# Patient Record
Sex: MALE | Race: WHITE | Employment: OTHER | URBAN - METROPOLITAN AREA
[De-identification: names, ages, dates, MRNs, and addresses within clinical notes are randomized per-mention and may not be internally consistent; named-entity substitution may affect disease eponyms.]

---

## 2022-05-29 ENCOUNTER — APPOINTMENT (OUTPATIENT)
Dept: GENERAL RADIOLOGY | Age: 72
DRG: 189 | End: 2022-05-29
Payer: MEDICARE

## 2022-05-29 ENCOUNTER — APPOINTMENT (OUTPATIENT)
Dept: CT IMAGING | Age: 72
DRG: 189 | End: 2022-05-29
Payer: MEDICARE

## 2022-05-29 ENCOUNTER — HOSPITAL ENCOUNTER (INPATIENT)
Age: 72
LOS: 2 days | Discharge: NURSING, REHAB OR LONG-TERM CARE | DRG: 189 | End: 2022-05-31
Attending: EMERGENCY MEDICINE | Admitting: INTERNAL MEDICINE
Payer: MEDICARE

## 2022-05-29 DIAGNOSIS — J18.9 PNEUMONIA OF LEFT LOWER LOBE DUE TO INFECTIOUS ORGANISM: ICD-10-CM

## 2022-05-29 DIAGNOSIS — R09.02 HYPOXIA: Primary | ICD-10-CM

## 2022-05-29 PROBLEM — J96.01 ACUTE HYPOXEMIC RESPIRATORY FAILURE (HCC): Status: ACTIVE | Noted: 2022-05-29

## 2022-05-29 PROBLEM — J96.91 RESPIRATORY FAILURE WITH HYPOXIA (HCC): Status: ACTIVE | Noted: 2022-05-29

## 2022-05-29 LAB
A/G RATIO: 0.9 (ref 1.1–2.2)
ALBUMIN SERPL-MCNC: 3.6 G/DL (ref 3.4–5)
ALP BLD-CCNC: 87 U/L (ref 40–129)
ALT SERPL-CCNC: 20 U/L (ref 10–40)
ANION GAP SERPL CALCULATED.3IONS-SCNC: 9 MMOL/L (ref 3–16)
ANISOCYTOSIS: ABNORMAL
AST SERPL-CCNC: 21 U/L (ref 15–37)
BANDED NEUTROPHILS RELATIVE PERCENT: 7 % (ref 0–7)
BASE EXCESS ARTERIAL: 10.7 MMOL/L (ref -3–3)
BASOPHILS ABSOLUTE: 0 K/UL (ref 0–0.2)
BASOPHILS RELATIVE PERCENT: 0 %
BILIRUB SERPL-MCNC: <0.2 MG/DL (ref 0–1)
BILIRUBIN URINE: NEGATIVE
BLOOD, URINE: NEGATIVE
BUN BLDV-MCNC: 21 MG/DL (ref 7–20)
CALCIUM SERPL-MCNC: 9.7 MG/DL (ref 8.3–10.6)
CARBOXYHEMOGLOBIN ARTERIAL: 1.2 % (ref 0–1.5)
CHLORIDE BLD-SCNC: 97 MMOL/L (ref 99–110)
CLARITY: CLEAR
CO2: 33 MMOL/L (ref 21–32)
COLOR: YELLOW
CREAT SERPL-MCNC: 0.8 MG/DL (ref 0.8–1.3)
EKG ATRIAL RATE: 97 BPM
EKG DIAGNOSIS: NORMAL
EKG P AXIS: -1 DEGREES
EKG P-R INTERVAL: 168 MS
EKG Q-T INTERVAL: 360 MS
EKG QRS DURATION: 80 MS
EKG QTC CALCULATION (BAZETT): 457 MS
EKG R AXIS: -41 DEGREES
EKG T AXIS: 48 DEGREES
EKG VENTRICULAR RATE: 97 BPM
EOSINOPHILS ABSOLUTE: 0.7 K/UL (ref 0–0.6)
EOSINOPHILS RELATIVE PERCENT: 7 %
GFR AFRICAN AMERICAN: >60
GFR NON-AFRICAN AMERICAN: >60
GLUCOSE BLD-MCNC: 110 MG/DL (ref 70–99)
GLUCOSE BLD-MCNC: 135 MG/DL (ref 70–99)
GLUCOSE BLD-MCNC: 146 MG/DL (ref 70–99)
GLUCOSE BLD-MCNC: 156 MG/DL (ref 70–99)
GLUCOSE BLD-MCNC: 89 MG/DL (ref 70–99)
GLUCOSE URINE: NEGATIVE MG/DL
HCO3 ARTERIAL: 37.1 MMOL/L (ref 21–29)
HCT VFR BLD CALC: 28.9 % (ref 40.5–52.5)
HEMOGLOBIN, ART, EXTENDED: 10.1 G/DL (ref 13.5–17.5)
HEMOGLOBIN: 9.6 G/DL (ref 13.5–17.5)
KETONES, URINE: NEGATIVE MG/DL
LACTIC ACID, SEPSIS: 0.9 MMOL/L (ref 0.4–1.9)
LACTIC ACID, SEPSIS: 1.2 MMOL/L (ref 0.4–1.9)
LEUKOCYTE ESTERASE, URINE: NEGATIVE
LYMPHOCYTES ABSOLUTE: 1.8 K/UL (ref 1–5.1)
LYMPHOCYTES RELATIVE PERCENT: 18 %
MCH RBC QN AUTO: 30.5 PG (ref 26–34)
MCHC RBC AUTO-ENTMCNC: 33.1 G/DL (ref 31–36)
MCV RBC AUTO: 92.1 FL (ref 80–100)
METHEMOGLOBIN ARTERIAL: 0 %
MICROSCOPIC EXAMINATION: NORMAL
MONOCYTES ABSOLUTE: 0.8 K/UL (ref 0–1.3)
MONOCYTES RELATIVE PERCENT: 8 %
NEUTROPHILS ABSOLUTE: 6.8 K/UL (ref 1.7–7.7)
NEUTROPHILS RELATIVE PERCENT: 60 %
NITRITE, URINE: NEGATIVE
O2 SAT, ARTERIAL: 100 %
O2 THERAPY: ABNORMAL
PCO2 ARTERIAL: 59.1 MMHG (ref 35–45)
PDW BLD-RTO: 15.8 % (ref 12.4–15.4)
PERFORMED ON: ABNORMAL
PERFORMED ON: NORMAL
PH ARTERIAL: 7.41 (ref 7.35–7.45)
PH UA: 7 (ref 5–8)
PLATELET # BLD: 301 K/UL (ref 135–450)
PLATELET SLIDE REVIEW: ADEQUATE
PMV BLD AUTO: 7.2 FL (ref 5–10.5)
PO2 ARTERIAL: 206 MMHG (ref 75–108)
POLYCHROMASIA: ABNORMAL
POTASSIUM REFLEX MAGNESIUM: 4.8 MMOL/L (ref 3.5–5.1)
PRO-BNP: 107 PG/ML (ref 0–124)
PROCALCITONIN: 0.1 NG/ML (ref 0–0.15)
PROTEIN UA: NEGATIVE MG/DL
RBC # BLD: 3.14 M/UL (ref 4.2–5.9)
SLIDE REVIEW: ABNORMAL
SODIUM BLD-SCNC: 139 MMOL/L (ref 136–145)
SPECIFIC GRAVITY UA: 1.02 (ref 1–1.03)
T4 FREE: 1.3 NG/DL (ref 0.9–1.8)
TCO2 ARTERIAL: 87.3 MMOL/L
TOTAL PROTEIN: 7.5 G/DL (ref 6.4–8.2)
TROPONIN: <0.01 NG/ML
TSH REFLEX: 14.41 UIU/ML (ref 0.27–4.2)
URINE REFLEX TO CULTURE: NORMAL
URINE TYPE: NORMAL
UROBILINOGEN, URINE: 1 E.U./DL
WBC # BLD: 10.1 K/UL (ref 4–11)

## 2022-05-29 PROCEDURE — 93005 ELECTROCARDIOGRAM TRACING: CPT | Performed by: EMERGENCY MEDICINE

## 2022-05-29 PROCEDURE — 87040 BLOOD CULTURE FOR BACTERIA: CPT

## 2022-05-29 PROCEDURE — 94761 N-INVAS EAR/PLS OXIMETRY MLT: CPT

## 2022-05-29 PROCEDURE — 2000000000 HC ICU R&B

## 2022-05-29 PROCEDURE — 87449 NOS EACH ORGANISM AG IA: CPT

## 2022-05-29 PROCEDURE — 85025 COMPLETE CBC W/AUTO DIFF WBC: CPT

## 2022-05-29 PROCEDURE — 99285 EMERGENCY DEPT VISIT HI MDM: CPT

## 2022-05-29 PROCEDURE — 6360000002 HC RX W HCPCS: Performed by: INTERNAL MEDICINE

## 2022-05-29 PROCEDURE — 2700000000 HC OXYGEN THERAPY PER DAY

## 2022-05-29 PROCEDURE — 84145 PROCALCITONIN (PCT): CPT

## 2022-05-29 PROCEDURE — 84439 ASSAY OF FREE THYROXINE: CPT

## 2022-05-29 PROCEDURE — 93010 ELECTROCARDIOGRAM REPORT: CPT | Performed by: INTERNAL MEDICINE

## 2022-05-29 PROCEDURE — 94640 AIRWAY INHALATION TREATMENT: CPT

## 2022-05-29 PROCEDURE — 83605 ASSAY OF LACTIC ACID: CPT

## 2022-05-29 PROCEDURE — 80053 COMPREHEN METABOLIC PANEL: CPT

## 2022-05-29 PROCEDURE — 2580000003 HC RX 258: Performed by: INTERNAL MEDICINE

## 2022-05-29 PROCEDURE — 83036 HEMOGLOBIN GLYCOSYLATED A1C: CPT

## 2022-05-29 PROCEDURE — 6370000000 HC RX 637 (ALT 250 FOR IP): Performed by: INTERNAL MEDICINE

## 2022-05-29 PROCEDURE — 99223 1ST HOSP IP/OBS HIGH 75: CPT | Performed by: INTERNAL MEDICINE

## 2022-05-29 PROCEDURE — 0BJ08ZZ INSPECTION OF TRACHEOBRONCHIAL TREE, VIA NATURAL OR ARTIFICIAL OPENING ENDOSCOPIC: ICD-10-PCS | Performed by: INTERNAL MEDICINE

## 2022-05-29 PROCEDURE — 6360000004 HC RX CONTRAST MEDICATION: Performed by: INTERNAL MEDICINE

## 2022-05-29 PROCEDURE — 71260 CT THORAX DX C+: CPT

## 2022-05-29 PROCEDURE — 84443 ASSAY THYROID STIM HORMONE: CPT

## 2022-05-29 PROCEDURE — 81003 URINALYSIS AUTO W/O SCOPE: CPT

## 2022-05-29 PROCEDURE — 31622 DX BRONCHOSCOPE/WASH: CPT | Performed by: INTERNAL MEDICINE

## 2022-05-29 PROCEDURE — 36415 COLL VENOUS BLD VENIPUNCTURE: CPT

## 2022-05-29 PROCEDURE — 71045 X-RAY EXAM CHEST 1 VIEW: CPT

## 2022-05-29 PROCEDURE — 82803 BLOOD GASES ANY COMBINATION: CPT

## 2022-05-29 PROCEDURE — 84484 ASSAY OF TROPONIN QUANT: CPT

## 2022-05-29 PROCEDURE — 83880 ASSAY OF NATRIURETIC PEPTIDE: CPT

## 2022-05-29 RX ORDER — SODIUM CHLORIDE 9 MG/ML
INJECTION, SOLUTION INTRAVENOUS PRN
Status: DISCONTINUED | OUTPATIENT
Start: 2022-05-29 | End: 2022-05-31 | Stop reason: HOSPADM

## 2022-05-29 RX ORDER — SODIUM CHLORIDE 0.9 % (FLUSH) 0.9 %
5-40 SYRINGE (ML) INJECTION PRN
Status: DISCONTINUED | OUTPATIENT
Start: 2022-05-29 | End: 2022-05-31 | Stop reason: HOSPADM

## 2022-05-29 RX ORDER — TRAZODONE HYDROCHLORIDE 50 MG/1
50 TABLET ORAL NIGHTLY
Status: DISCONTINUED | OUTPATIENT
Start: 2022-05-29 | End: 2022-05-31 | Stop reason: HOSPADM

## 2022-05-29 RX ORDER — ACETAMINOPHEN 160 MG/5ML
650 SUSPENSION ORAL EVERY 6 HOURS PRN
COMMUNITY

## 2022-05-29 RX ORDER — INSULIN LISPRO 100 [IU]/ML
0-3 INJECTION, SOLUTION INTRAVENOUS; SUBCUTANEOUS NIGHTLY
Status: DISCONTINUED | OUTPATIENT
Start: 2022-05-29 | End: 2022-05-31 | Stop reason: HOSPADM

## 2022-05-29 RX ORDER — GLYCOPYRROLATE 0.2 MG/ML
0.2 INJECTION INTRAMUSCULAR; INTRAVENOUS EVERY 8 HOURS PRN
COMMUNITY

## 2022-05-29 RX ORDER — BUPROPION HYDROCHLORIDE 75 MG/1
75 TABLET ORAL 2 TIMES DAILY
COMMUNITY

## 2022-05-29 RX ORDER — LACOSAMIDE 100 MG/1
100 TABLET ORAL 2 TIMES DAILY
Status: DISCONTINUED | OUTPATIENT
Start: 2022-05-29 | End: 2022-05-31 | Stop reason: HOSPADM

## 2022-05-29 RX ORDER — HYDROXYZINE 50 MG/1
50 TABLET, FILM COATED ORAL EVERY 8 HOURS PRN
COMMUNITY

## 2022-05-29 RX ORDER — LACOSAMIDE 100 MG/1
100 TABLET ORAL 2 TIMES DAILY
COMMUNITY

## 2022-05-29 RX ORDER — ALBUTEROL SULFATE 2.5 MG/3ML
2.5 SOLUTION RESPIRATORY (INHALATION) 2 TIMES DAILY
Status: ON HOLD | COMMUNITY
End: 2022-05-29

## 2022-05-29 RX ORDER — LIDOCAINE 4 G/G
1 PATCH TOPICAL DAILY
COMMUNITY

## 2022-05-29 RX ORDER — BUPROPION HYDROCHLORIDE 75 MG/1
75 TABLET ORAL 2 TIMES DAILY
Status: DISCONTINUED | OUTPATIENT
Start: 2022-05-30 | End: 2022-05-31 | Stop reason: HOSPADM

## 2022-05-29 RX ORDER — QUETIAPINE FUMARATE 25 MG/1
25 TABLET, FILM COATED ORAL 2 TIMES DAILY
COMMUNITY

## 2022-05-29 RX ORDER — IBUPROFEN 400 MG/1
400 TABLET ORAL EVERY 6 HOURS PRN
COMMUNITY

## 2022-05-29 RX ORDER — ALBUTEROL SULFATE 90 UG/1
2 AEROSOL, METERED RESPIRATORY (INHALATION) EVERY 6 HOURS PRN
COMMUNITY

## 2022-05-29 RX ORDER — ALBUTEROL SULFATE 2.5 MG/3ML
2.5 SOLUTION RESPIRATORY (INHALATION) EVERY 6 HOURS PRN
Status: DISCONTINUED | OUTPATIENT
Start: 2022-05-29 | End: 2022-05-31 | Stop reason: HOSPADM

## 2022-05-29 RX ORDER — ONDANSETRON 2 MG/ML
4 INJECTION INTRAMUSCULAR; INTRAVENOUS EVERY 6 HOURS PRN
Status: DISCONTINUED | OUTPATIENT
Start: 2022-05-29 | End: 2022-05-31 | Stop reason: HOSPADM

## 2022-05-29 RX ORDER — ONDANSETRON HYDROCHLORIDE 8 MG/1
8 TABLET, FILM COATED ORAL EVERY 6 HOURS PRN
COMMUNITY

## 2022-05-29 RX ORDER — IPRATROPIUM BROMIDE AND ALBUTEROL SULFATE 2.5; .5 MG/3ML; MG/3ML
1 SOLUTION RESPIRATORY (INHALATION) EVERY 4 HOURS PRN
Status: DISCONTINUED | OUTPATIENT
Start: 2022-05-29 | End: 2022-05-31 | Stop reason: HOSPADM

## 2022-05-29 RX ORDER — FAMOTIDINE 20 MG/1
20 TABLET, FILM COATED ORAL 2 TIMES DAILY
Status: DISCONTINUED | OUTPATIENT
Start: 2022-05-29 | End: 2022-05-31 | Stop reason: HOSPADM

## 2022-05-29 RX ORDER — VALPROIC ACID 250 MG/5ML
400 SOLUTION ORAL 2 TIMES DAILY
COMMUNITY

## 2022-05-29 RX ORDER — ONDANSETRON 4 MG/1
4 TABLET, ORALLY DISINTEGRATING ORAL EVERY 8 HOURS PRN
Status: DISCONTINUED | OUTPATIENT
Start: 2022-05-29 | End: 2022-05-31 | Stop reason: HOSPADM

## 2022-05-29 RX ORDER — CHLORHEXIDINE GLUCONATE 0.12 MG/ML
15 RINSE ORAL 3 TIMES DAILY
COMMUNITY

## 2022-05-29 RX ORDER — VANCOMYCIN HYDROCHLORIDE 1 G/200ML
1000 INJECTION, SOLUTION INTRAVENOUS ONCE
Status: DISCONTINUED | OUTPATIENT
Start: 2022-05-29 | End: 2022-05-29

## 2022-05-29 RX ORDER — TRAMADOL HYDROCHLORIDE 50 MG/1
50 TABLET ORAL EVERY 8 HOURS PRN
COMMUNITY

## 2022-05-29 RX ORDER — LISINOPRIL 5 MG/1
5 TABLET ORAL DAILY
COMMUNITY

## 2022-05-29 RX ORDER — SENNA PLUS 8.6 MG/1
1 TABLET ORAL 2 TIMES DAILY PRN
COMMUNITY

## 2022-05-29 RX ORDER — INSULIN LISPRO 100 [IU]/ML
0-6 INJECTION, SOLUTION INTRAVENOUS; SUBCUTANEOUS
Status: DISCONTINUED | OUTPATIENT
Start: 2022-05-29 | End: 2022-05-31 | Stop reason: HOSPADM

## 2022-05-29 RX ORDER — FLUTICASONE PROPIONATE 50 MCG
1 SPRAY, SUSPENSION (ML) NASAL DAILY PRN
COMMUNITY

## 2022-05-29 RX ORDER — PHENOL 1.4 %
10 AEROSOL, SPRAY (ML) MUCOUS MEMBRANE NIGHTLY
COMMUNITY

## 2022-05-29 RX ORDER — IPRATROPIUM BROMIDE AND ALBUTEROL SULFATE 2.5; .5 MG/3ML; MG/3ML
1 SOLUTION RESPIRATORY (INHALATION) 2 TIMES DAILY
Status: DISCONTINUED | OUTPATIENT
Start: 2022-05-29 | End: 2022-05-31 | Stop reason: HOSPADM

## 2022-05-29 RX ORDER — LEVOFLOXACIN 750 MG/1
750 TABLET ORAL DAILY
COMMUNITY

## 2022-05-29 RX ORDER — TERAZOSIN 2 MG/1
2 CAPSULE ORAL NIGHTLY
COMMUNITY

## 2022-05-29 RX ORDER — SODIUM CHLORIDE FOR INHALATION 3 %
4 VIAL, NEBULIZER (ML) INHALATION EVERY 8 HOURS
COMMUNITY

## 2022-05-29 RX ORDER — FOLIC ACID 1 MG/1
1 TABLET ORAL DAILY
COMMUNITY

## 2022-05-29 RX ORDER — LEVOTHYROXINE SODIUM 0.15 MG/1
150 TABLET ORAL DAILY
COMMUNITY

## 2022-05-29 RX ORDER — IPRATROPIUM BROMIDE AND ALBUTEROL SULFATE 2.5; .5 MG/3ML; MG/3ML
1 SOLUTION RESPIRATORY (INHALATION) EVERY 8 HOURS
COMMUNITY

## 2022-05-29 RX ORDER — ECHINACEA PURPUREA EXTRACT 125 MG
1 TABLET ORAL 2 TIMES DAILY
COMMUNITY

## 2022-05-29 RX ORDER — FAMOTIDINE 20 MG/1
20 TABLET, FILM COATED ORAL 2 TIMES DAILY
COMMUNITY

## 2022-05-29 RX ORDER — ACETYLCYSTEINE 200 MG/ML
4 SOLUTION ORAL; RESPIRATORY (INHALATION) 2 TIMES DAILY
COMMUNITY

## 2022-05-29 RX ORDER — ESCITALOPRAM OXALATE 20 MG/1
20 TABLET ORAL DAILY
COMMUNITY

## 2022-05-29 RX ORDER — DOXYCYCLINE 100 MG/1
100 CAPSULE ORAL DAILY
COMMUNITY

## 2022-05-29 RX ORDER — ESCITALOPRAM OXALATE 10 MG/1
20 TABLET ORAL DAILY
Status: DISCONTINUED | OUTPATIENT
Start: 2022-05-30 | End: 2022-05-31 | Stop reason: HOSPADM

## 2022-05-29 RX ORDER — POLYETHYLENE GLYCOL 3350 17 G/17G
17 POWDER, FOR SOLUTION ORAL DAILY PRN
Status: DISCONTINUED | OUTPATIENT
Start: 2022-05-29 | End: 2022-05-31 | Stop reason: HOSPADM

## 2022-05-29 RX ORDER — ACETAMINOPHEN 650 MG/1
650 SUPPOSITORY RECTAL EVERY 6 HOURS PRN
Status: DISCONTINUED | OUTPATIENT
Start: 2022-05-29 | End: 2022-05-31 | Stop reason: HOSPADM

## 2022-05-29 RX ORDER — LEVOTHYROXINE SODIUM 0.1 MG/1
150 TABLET ORAL DAILY
Status: DISCONTINUED | OUTPATIENT
Start: 2022-05-30 | End: 2022-05-31 | Stop reason: HOSPADM

## 2022-05-29 RX ORDER — DICLOXACILLIN SODIUM 250 MG/1
250 CAPSULE ORAL 4 TIMES DAILY
Status: ON HOLD | COMMUNITY
End: 2022-05-29

## 2022-05-29 RX ORDER — TRAZODONE HYDROCHLORIDE 50 MG/1
50 TABLET ORAL NIGHTLY
COMMUNITY

## 2022-05-29 RX ORDER — QUETIAPINE FUMARATE 25 MG/1
25 TABLET, FILM COATED ORAL 2 TIMES DAILY
Status: DISCONTINUED | OUTPATIENT
Start: 2022-05-29 | End: 2022-05-31 | Stop reason: HOSPADM

## 2022-05-29 RX ORDER — GUAIFENESIN AND CODEINE PHOSPHATE 100; 10 MG/5ML; MG/5ML
10 SOLUTION ORAL 4 TIMES DAILY PRN
Status: ON HOLD | COMMUNITY
End: 2022-05-29

## 2022-05-29 RX ORDER — VALPROIC ACID 250 MG/5ML
400 SOLUTION ORAL 2 TIMES DAILY
Status: DISCONTINUED | OUTPATIENT
Start: 2022-05-29 | End: 2022-05-31 | Stop reason: HOSPADM

## 2022-05-29 RX ORDER — DEXTROSE MONOHYDRATE 50 MG/ML
100 INJECTION, SOLUTION INTRAVENOUS PRN
Status: DISCONTINUED | OUTPATIENT
Start: 2022-05-29 | End: 2022-05-31 | Stop reason: HOSPADM

## 2022-05-29 RX ORDER — DEXTROMETHORPHAN HBR, GUAIFENESIN, PHENYLEPHRINE 20; 200; 10 MG/10ML; MG/10ML; MG/10ML
10 LIQUID ORAL EVERY 4 HOURS PRN
COMMUNITY

## 2022-05-29 RX ORDER — TRAMADOL HYDROCHLORIDE 50 MG/1
50 TABLET ORAL EVERY 8 HOURS PRN
Status: DISCONTINUED | OUTPATIENT
Start: 2022-05-29 | End: 2022-05-31 | Stop reason: HOSPADM

## 2022-05-29 RX ORDER — ALBUTEROL SULFATE 2.5 MG/3ML
2.5 SOLUTION RESPIRATORY (INHALATION)
COMMUNITY

## 2022-05-29 RX ORDER — LISINOPRIL 10 MG/1
5 TABLET ORAL DAILY
Status: DISCONTINUED | OUTPATIENT
Start: 2022-05-30 | End: 2022-05-31 | Stop reason: HOSPADM

## 2022-05-29 RX ORDER — SODIUM CHLORIDE 0.9 % (FLUSH) 0.9 %
5-40 SYRINGE (ML) INJECTION EVERY 12 HOURS SCHEDULED
Status: DISCONTINUED | OUTPATIENT
Start: 2022-05-29 | End: 2022-05-31 | Stop reason: HOSPADM

## 2022-05-29 RX ORDER — ACETAMINOPHEN 325 MG/1
650 TABLET ORAL EVERY 6 HOURS PRN
Status: DISCONTINUED | OUTPATIENT
Start: 2022-05-29 | End: 2022-05-31 | Stop reason: HOSPADM

## 2022-05-29 RX ADMIN — LACOSAMIDE 100 MG: 100 TABLET, FILM COATED ORAL at 19:37

## 2022-05-29 RX ADMIN — IOPAMIDOL 75 ML: 755 INJECTION, SOLUTION INTRAVENOUS at 09:03

## 2022-05-29 RX ADMIN — IPRATROPIUM BROMIDE AND ALBUTEROL SULFATE 1 AMPULE: .5; 3 SOLUTION RESPIRATORY (INHALATION) at 20:00

## 2022-05-29 RX ADMIN — ACETAMINOPHEN 650 MG: 325 TABLET ORAL at 19:37

## 2022-05-29 RX ADMIN — FAMOTIDINE 20 MG: 20 TABLET, FILM COATED ORAL at 19:37

## 2022-05-29 RX ADMIN — SODIUM CHLORIDE, PRESERVATIVE FREE 10 ML: 5 INJECTION INTRAVENOUS at 19:38

## 2022-05-29 RX ADMIN — VALPROIC ACID 400 MG: 250 SOLUTION ORAL at 19:36

## 2022-05-29 RX ADMIN — QUETIAPINE FUMARATE 25 MG: 25 TABLET ORAL at 18:25

## 2022-05-29 RX ADMIN — VANCOMYCIN HYDROCHLORIDE 1000 MG: 1 INJECTION, POWDER, LYOPHILIZED, FOR SOLUTION INTRAVENOUS at 10:52

## 2022-05-29 RX ADMIN — TRAZODONE HYDROCHLORIDE 50 MG: 50 TABLET ORAL at 19:37

## 2022-05-29 RX ADMIN — APIXABAN 5 MG: 5 TABLET, FILM COATED ORAL at 19:37

## 2022-05-29 RX ADMIN — CEFEPIME HYDROCHLORIDE 2000 MG: 2 INJECTION, POWDER, FOR SOLUTION INTRAVENOUS at 09:16

## 2022-05-29 ASSESSMENT — PAIN SCALES - GENERAL
PAINLEVEL_OUTOF10: 6
PAINLEVEL_OUTOF10: 0
PAINLEVEL_OUTOF10: 6
PAINLEVEL_OUTOF10: 0

## 2022-05-29 ASSESSMENT — PAIN DESCRIPTION - ONSET: ONSET: ON-GOING

## 2022-05-29 ASSESSMENT — PAIN DESCRIPTION - PAIN TYPE
TYPE: ACUTE PAIN
TYPE: ACUTE PAIN

## 2022-05-29 ASSESSMENT — PAIN - FUNCTIONAL ASSESSMENT
PAIN_FUNCTIONAL_ASSESSMENT: PREVENTS OR INTERFERES SOME ACTIVE ACTIVITIES AND ADLS
PAIN_FUNCTIONAL_ASSESSMENT: 0-10
PAIN_FUNCTIONAL_ASSESSMENT: ACTIVITIES ARE NOT PREVENTED

## 2022-05-29 ASSESSMENT — PAIN DESCRIPTION - ORIENTATION: ORIENTATION: RIGHT;LEFT

## 2022-05-29 ASSESSMENT — PAIN DESCRIPTION - FREQUENCY
FREQUENCY: CONTINUOUS
FREQUENCY: CONTINUOUS

## 2022-05-29 ASSESSMENT — PAIN DESCRIPTION - LOCATION
LOCATION: BACK
LOCATION: CHEST

## 2022-05-29 NOTE — PROGRESS NOTES
Pt admitted to room 5908 from ED, pt alert and oriented, non verbal, mouth words and write out need appropriately. VSS on T-Pice, 70%/10L. Pt oriented to room, POC reviewed with pt and wife, all needs with reach, will continue to monitor.

## 2022-05-29 NOTE — ED NOTES
Bedside handoff given to [facility]. This RN transported pt with monitor to room 5908. Wife at bedside. Suctioned thick secretions prior to transport. Wife stated pt has not been sitting up in a chair at ecf. Prior to recent ecf placement.   Pt had worked with pt and ot, able to walk some with walker and doing some adl's     Severiano Kerry, RN  05/29/22 [address], RN  05/29/22 [id number]

## 2022-05-29 NOTE — ACP (ADVANCE CARE PLANNING)
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 Advanced Care Planning Documents: Completed advances directives, advanced directives in chart.       Electronically signed by Radha Wallace MD on 5/29/2022 at 5:54 PM

## 2022-05-29 NOTE — PROGRESS NOTES
4 Eyes Skin Assessment     NAME:  Ivonne Tyson  YOB: 1950  MEDICAL RECORD NUMBER:  [id number]    The patient is being assess for  Admission    I agree that 2 RN's have performed a thorough Head to Toe Skin Assessment on the patient. ALL assessment sites listed below have been assessed. Areas assessed by both nurses:    Head, Face, Ears, Shoulders, Back, Chest, Arms, Elbows, Hands, Sacrum. Buttock, Coccyx, Ischium and Legs. Feet and Heels        Does the Patient have a Wound?  No noted wound(s)       Tony Prevention initiated:  Yes   Wound Care Orders initiated:  NA    Pressure Injury (Stage 3,4, Unstageable, DTI, NWPT, and Complex wounds) if present place consult order under [de-identified] NA    New and Established Ostomies if present place consult order under : No      Nurse 1 eSignature: Electronically signed by Sarina Treviño RN on 5/29/22 at 5:29 PM EDT    **SHARE this note so that the co-signing nurse is able to place an eSignature**    Nurse 2 eSignature: Electronically signed by Yossi Rush RN on 5/29/22 at [address] PM EDT

## 2022-05-29 NOTE — H&P
HOSPITALISTS HISTORY AND PHYSICAL    5/29/2022 8:55 AM    Patient Information:  Miguel Wong is a 71 y.o. male [id number]  PCP:  No primary care provider on file. (Tel: None )    Chief complaint:    Chief Complaint   Patient presents with    Shortness of Breath     Pt brought in by [facility] for CP, possible syncopal event, found unresponsive by staff at [id number] hrs, when EMS arrived Pt was sitting in a chair, pale/diaphoretic, nonverbal pointing at his chest that it hurts. 6/10 pain, hx of afib and DM. EKG showed ST per EMS.  Chest Pain        History of Present Illness:  Gerry Friare is a 71 y.o. 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  When EMS arrived, patient was pale, diaphoretic and reported that his chest was hurting. Stat EKG obtained showed sinus tachycardia per EMS report. On admission, patient was hypoxic and hypercarbic. CXR showed complete opacification of left hemithorax. Of note, patient was recently admitted at [facility] (4/18/22 -5/2/22) for DHIRAJ PNA; treated Vanco and cefepime for MRSA and Pseudomonas pneumonia. Underwent bronchoscopy on 4/29/22 which showed severe mucous plugging. BAL grew antonio which was thought to be contamination per infectious disease recs.        REVIEW OF SYSTEMS:   Detailed 12 point ROS obtained which were negative except what mentioned above in HPI    Past Medical History:   has a past medical history of Anemia, Anxiety, Aspiration pneumonia ([address] Atrial fibrillation ([address] CAD (coronary artery disease), Cancer ([address] Chronic kidney disease, Depression, Diabetes mellitus ([address] GERD (gastroesophageal reflux disease), History of malignant neoplasm of tongue, Hyperlipidemia, Hypertension, IBS (irritable bowel syndrome), Pneumonia, Schizoaffective disorder ([facility] [facility] Seizures ([facility] and Thyroid disease. Past Surgical History:   has a past surgical history that includes tracheostomy and Gastrostomy tube placement. Medications:  No current facility-administered medications on file prior to encounter. Current Outpatient Medications on File Prior to Encounter   Medication Sig Dispense Refill    acetaminophen (TYLENOL) 160 MG/5ML liquid Take 15 mg/kg by mouth every 6 hours as needed for Fever or Pain      acetylcysteine (MUCOMYST) 20 % nebulizer solution 4 mLs 2 times daily      albuterol sulfate HFA (PROVENTIL HFA) 108 (90 Base) MCG/ACT inhaler Inhale 2 puffs into the lungs every 6 hours as needed for Wheezing or Shortness of Breath      albuterol (PROVENTIL) (2.5 MG/3ML) 0.083% nebulizer solution Take 2.5 mg by nebulization every 2 hours as needed for Wheezing or Shortness of Breath      apixaban (ELIQUIS) 5 MG TABS tablet Take 5 mg by mouth 2 times daily      buPROPion (WELLBUTRIN) 75 MG tablet Take 75 mg by mouth 2 times daily      chlorhexidine (PERIDEX) 0.12 % solution Take 15 mLs by mouth in the morning, at noon, and at bedtime Take 15 ml by mouth TID for URI.       dicloxacillin (DYNAPEN) 250 MG capsule Take 250 mg by mouth 4 times daily      diclofenac sodium (VOLTAREN) 1 % GEL Apply 2 g topically 2 times daily      doxycycline Monohydrate (VIBRAMYCIN) 25 MG/5ML SUSR suspension 100 mg by Per G Tube route daily      escitalopram (LEXAPRO) 20 MG tablet Take 20 mg by mouth daily      famotidine (PEPCID) 20 MG tablet Take 20 mg by mouth 2 times daily      fluticasone (FLONASE) 50 MCG/ACT nasal spray 1 spray by Each Nostril route daily as needed for Rhinitis      folic acid (FOLVITE) 1 MG tablet Take 1 mg by mouth daily      glycopyrrolate (ROBINUL) 0.2 MG/ML injection Infuse 0.2 mg intravenously every 8 hours as needed (neurologic disorders)      insulin regular (HUMULIN R;NOVOLIN R) 100 UNIT/ML injection Inject into the skin 3 times daily (before meals) 0-150 = 0  151-200 = 2  201-250 = 4  251-300 = 6  301-350 = 8  351-600 = 12 recheck in 2 hours      hydrOXYzine (ATARAX) 50 MG tablet Take 50 mg by mouth every 8 hours as needed for Itching or Anxiety      ibuprofen (ADVIL;MOTRIN) 400 MG tablet Take 400 mg by mouth every 6 hours as needed for Pain      ipratropium-albuterol (DUONEB) 0.5-2.5 (3) MG/3ML SOLN nebulizer solution Inhale 1 vial into the lungs every 8 hours      levoFLOXacin (LEVAQUIN) 750 MG tablet Take 750 mg by mouth daily For 5 days, started on 5/12/21.  levothyroxine (SYNTHROID) 150 MCG tablet Take 150 mcg by mouth Daily      lidocaine 4 % external patch Place 1 patch onto the skin daily 12 hours on, 12 hours off.  lisinopril (PRINIVIL;ZESTRIL) 5 MG tablet Take 5 mg by mouth daily      melatonin 3 mg TABS tablet Take 10 mg by mouth daily      ondansetron (ZOFRAN) 8 MG tablet Take 8 mg by mouth every 6 hours as needed for Nausea or Vomiting      QUEtiapine (SEROQUEL) 25 MG tablet Take 25 mg by mouth 2 times daily At noon and 1700 hrs.  guaiFENesin-codeine (ROBAFEN AC) 100-10 MG/5ML syrup Take 10 mLs by mouth 4 times daily as needed for Cough.  sodium chloride (SALINE MIST) 0.65 % nasal spray 1 spray by Nasal route in the morning and at bedtime      senna (SENOKOT) 8.6 MG tablet Take 1 tablet by mouth 2 times daily as needed for Constipation      sodium chloride, Inhalant, 3 % nebulizer solution Take 4 mLs by nebulization every 8 hours      terazosin (HYTRIN) 2 MG capsule Take 2 mg by mouth nightly      traMADol (ULTRAM) 50 MG tablet Take 50 mg by mouth every 8 hours as needed for Pain.       traZODone (DESYREL) 50 MG tablet Take 50 mg by mouth nightly      valproic acid (DEPAKENE) 250 MG/5ML SOLN oral solution Take 400 mg by mouth 2 times daily      lacosamide (VIMPAT) 100 MG TABS tablet Take 100 mg by mouth 2 times daily. Allergies: Allergies   Allergen Reactions    Ciprofloxacin     Hydromorphone         Social History:        Family History:  family history is not on file. Physical Exam:  /78   Pulse 89   Temp 98.3 °F (36.8 °C) (Oral)   Resp 19   Ht 5' 6\" (1.676 m)   Wt 145 lb (65.8 kg)   SpO2 100%   BMI 23.40 kg/m²     General appearance: I awake and alert in no apparent distress  HEENT Normal cephalic, atraumatic without obvious deformity. Trach in place  Lungs: No increased work of breathing. Diminished breath sound on left hemithorax  Heart: Regular rate and rhythm with Normal S1/S2 without murmurs  Abdomen: Soft, non-tender, nondistended normal BS. PEG in place  Extremities: No clubbing, cyanosis, or edema bilaterally. Full range of motion without deformity and normal gait intact. Skin: Skin color, texture, turgor normal.  No rashes or lesions. Neurologic: Alert and oriented  Cranial nerves: II-XII intact, grossly non-focal.  Psychiatry: Calm, awake and alert  Skin: Warm, dry, normal turgor, no rash  Brisk capillary refill, peripheral pulses palpable     Labs:  CBC:   Lab Results   Component Value Date    WBC 10.1 05/29/2022    RBC 3.14 05/29/2022    HGB 9.6 05/29/2022    HCT 28.9 05/29/2022    MCV 92.1 05/29/2022    MCH 30.5 05/29/2022    MCHC 33.1 05/29/2022    RDW 15.8 05/29/2022     05/29/2022    MPV 7.2 05/29/2022     BMP:    Lab Results   Component Value Date     05/29/2022    K 4.8 05/29/2022    CL 97 05/29/2022    CO2 33 05/29/2022    BUN 21 05/29/2022    CREATININE 0.8 05/29/2022    CALCIUM 9.7 05/29/2022    GFRAA >60 05/29/2022    LABGLOM >60 05/29/2022    GLUCOSE 156 05/29/2022     XR CHEST PORTABLE   Final Result   Complete opacification of the left hemithorax. CT CHEST W CONTRAST    (Results Pending)       Discussed case  with ED physician    Problem List  Active Problems:    * No active hospital problems. *  Resolved Problems:    * No resolved hospital problems. *        Assessment/Plan:     Acute hypoxic and hypercapnic respiratory failure likely 2/2 mucous plugging; T-piece in place at FiO2 70%  CXR complete opacification of left hemithorax. CT chest showed near complete opacification left hemithorax without any obvious airway obstruction. Severe bronchiectasis and fibrosis noted  Pulmonology on board: s/p bronch on 5/ 29/22 which showed scant secretion with widely patent airways. BAL cx sent  Continue scheduled nebulized treatment, MetaNebs and vest therapy  Supplemental oxygen as needed to keep sats above 90%    Hx of A. fib: On Eliquis    Hypertension: Continue lisinopril. Monitor closely    Hx of diabetes mellitus: Hold oral antidiabetic medications  Hemoglobin A1c ordered. Start SSI. Monitor glucose closely    Hx of hypothyroidism: Continue Synthroid. 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 Please note that some part of this chart was generated using Dragon dictation software. Although every effort was made to ensure the accuracy of this automated transcription, some errors in transcription may have occurred inadvertently. If you may need any clarification, please do not hesitate to contact me through [facility].        Nati Vernon MD    5/29/2022 8:55 AM

## 2022-05-29 NOTE — RT PROTOCOL NOTE
RT Nebulizer Bronchodilator Protocol Note    There is a bronchodilator order in the chart from a provider indicating to follow the RT Bronchodilator Protocol and there is an Initiate RT Bronchodilator Protocol order as well (see protocol at bottom of note). CXR Findings:  XR CHEST PORTABLE    Result Date: 5/29/2022  Complete opacification of the left hemithorax. The findings from the last RT Protocol Assessment were as follows:  Smoking: Smoker 15 pack years or more  Respiratory Pattern: Regular pattern and RR 12-20 bpm  Breath Sounds: Slightly diminished and/or crackles  Cough: Strong, productive  Indication for Bronchodilator Therapy:    Bronchodilator Assessment Score: 4    Aerosolized bronchodilator medication orders have been revised according to the RT Nebulizer Bronchodilator Protocol below. Respiratory Therapist to perform RT Therapy Protocol Assessment initially then follow the protocol. Repeat RT Therapy Protocol Assessment PRN for score 0-3 or on second treatment, BID, and PRN for scores above 3. No Indications - adjust the frequency to every 6 hours PRN wheezing or bronchospasm, if no treatments needed after 48 hours then discontinue using Per Protocol order mode. If indication present, adjust the RT bronchodilator orders based on the Bronchodilator Assessment Score as indicated below. If a patient is on this medication at home then do not decrease Frequency below that used at home. 0-3 - enter or revise RT bronchodilator order(s) to equivalent RT Bronchodilator order with Frequency of every 4 hours PRN for wheezing or increased work of breathing using Per Protocol order mode. 4-6 - enter or revise RT Bronchodilator order(s) to two equivalent RT bronchodilator orders with one order with BID Frequency and one order with Frequency of every 4 hours PRN wheezing or increased work of breathing using Per Protocol order mode.          7-10 - enter or revise RT Bronchodilator order(s) to two equivalent RT bronchodilator orders with one order with TID Frequency and one order with Frequency of every 4 hours PRN wheezing or increased work of breathing using Per Protocol order mode. 11-13 - enter or revise RT Bronchodilator order(s) to one equivalent RT bronchodilator order with QID Frequency and an Albuterol order with Frequency of every 4 hours PRN wheezing or increased work of breathing using Per Protocol order mode. Greater than 13 - enter or revise RT Bronchodilator order(s) to one equivalent RT bronchodilator order with every 4 hours Frequency and an Albuterol order with Frequency of every 2 hours PRN wheezing or increased work of breathing using Per Protocol order mode. RT to enter RT Home Evaluation for COPD & MDI Assessment order using Per Protocol order mode.     Electronically signed by Aundria Schaumann, RCP on 5/29/2022 at 1:09 PM

## 2022-05-29 NOTE — PROGRESS NOTES
05/29/22 1306   RT Protocol   History Pulmonary Disease 1   Respiratory pattern 0   Breath sounds 2   Cough 1   Bronchodilator Assessment Score 4

## 2022-05-29 NOTE — PROCEDURES
Bronchoscopy Procedure Note    Date of Operation: 5/29/22  Pre-op Diagnosis: Atelectasis left lung  Post-op Diagnosis: Same  Surgeon: Kirill Moe MD  Anesthesia: None  Estimate Blood Loss: Minimal   Complications: None    Indications and History:  The patient is 71 y.o. male with imaging suggesting atelectasis of the left lung. . The risks, benefits, complications, treatment options and expected outcomes were discussed with the patient. The possibilities of reaction to medication, pulmonary aspiration, perforation of a viscus, bleeding, failure to diagnose a condition and creating a complication requiring transfusion or operation were discussed with the patient who freely signed the consent. Description of Procedure: The patient was taken to endoscopy suite, identified as [facility] and the procedure verified as flexible fiberoptic bronchoscopy. A time out was held and the above information confirmed. After the induction of topical nasopharyngeal anesthesia, the patient was placed in appropriate position and the bronchoscope was passed through the existing tracheostomy. . Lidocaine 2% 3 ml was used topically on the eduardo. Careful inspection of the tracheal lumen was accomplished. The scope was sequentially passed into the left main and then left upper and lower bronchi and segmental bronchi. The scope was then withdrawn and advanced into the right main bronchus and then into the RUL, RML, and RLL bronchi and segmental bronchi. Endobronchial findings:   Trachea: Normal mucosa   Eduardo: Normal mucosa   Right main bronchus: Normal mucosa   Right upper lobe bronchus: Normal mucosa   Right middle lobe bronchus: Normal mucosa   Right lower lobe bronchus: Normal mucosa   Left main bronchus: Normal mucosa   Left upper lobe bronchus: Normal mucosa   Left lower lobe bronchus: Normal mucosa     No obstructing lesions noted. No foreign body  Scant secretions with widely patent airways.     Chest imaging appears explained by chronic changes. The patient was taken to the endoscopy recovery area in satisfactory condition. Recommendation:   1. F/U on culture results   2. F/U on cytology results     Attestation: I performed the procedure.   Kirill Moe MD

## 2022-05-29 NOTE — CONSULTS
Pharmacy Medication History Note      List of current medications patient is taking is complete. Source of information: medication list from facility    Changes made to medication list:    Medications removed (include reason, ex. therapy complete or physician discontinued):  N/A    Medications added/doses adjusted:  N/A    Other notes (ex. Recent course of antibiotics, Coumadin dosing): Takes medications via PEG tube. Route of administration updated on medications. Started on Levaquin 5/27/22 x 5 days for URI.     Rosana Nielsen, PharmD, [facility]

## 2022-05-29 NOTE — CONSULTS
P Pulmonary and Critical Care   Consult Note      Reason for Consult: Left lung atelectasis  Requesting Physician: Dr. Gramajo Single:   [facility]:                The patient is a 71 y.o. male with significant past medical history of:      Diagnosis Date    Anemia     Anxiety     [facility]     Atrial fibrillation ([facility]     CAD (coronary artery disease)     Cancer [facility]     lung ca    Chronic kidney disease     Depression     Diabetes mellitus ([facility]     GERD (gastroesophageal reflux disease)     History of malignant neoplasm of tongue     Hyperlipidemia     Hypertension     IBS (irritable bowel syndrome)     Pneumonia     Schizoaffective disorder ([facility]     Seizures [facility]     Thyroid disease      Patient presented to the emergency department from the nursing home with a chief complaint of increased shortness of breath and associated cough. He has a history of tracheostomy since October 2021. Going back further, the patient has a history of head and neck and lung cancer around 2016. This was treated with radiation, chemotherapy and immunotherapy. He has not been on treatment for the last couple of years. He ended up getting tracheostomy because of recurrent bouts of pneumonia. There was concern that this may be on the basis of aspiration. He also now has a feeding tube. He was recently transferred to a local nursing home and there are no records in our system. His wife was able to provide details.   The patient is alert but nonverbal      Past Surgical History:        Procedure Laterality Date    GASTROSTOMY TUBE PLACEMENT      TRACHEOSTOMY       Current Medications:    Current Facility-Administered Medications: cefepime (MAXIPIME) 2000 mg IVPB minibag in NS, 2,000 mg, IntraVENous, Q12H  vancomycin 1000 mg IVPB in 250 mL D5W addavial, 1,000 mg, IntraVENous, Once  sodium chloride flush 0.9 % injection 5-40 mL, 5-40 mL, IntraVENous, 2 times per day  sodium chloride flush 0.9 % injection 5-40 mL, 5-40 mL, IntraVENous, PRN  0.9 % sodium chloride infusion, , IntraVENous, PRN  ondansetron (ZOFRAN-ODT) disintegrating tablet 4 mg, 4 mg, Oral, Q8H PRN **OR** ondansetron (ZOFRAN) injection 4 mg, 4 mg, IntraVENous, Q6H PRN  polyethylene glycol (GLYCOLAX) packet 17 g, 17 g, Oral, Daily PRN  acetaminophen (TYLENOL) tablet 650 mg, 650 mg, Oral, Q6H PRN **OR** acetaminophen (TYLENOL) suppository 650 mg, 650 mg, Rectal, Q6H PRN  albuterol (PROVENTIL) nebulizer solution 2.5 mg, 2.5 mg, Nebulization, Q6H PRN  ipratropium-albuterol (DUONEB) nebulizer solution 1 ampule, 1 ampule, Inhalation, Q4H PRN  dextrose bolus 10% 125 mL, 125 mL, IntraVENous, PRN **OR** dextrose bolus 10% 250 mL, 250 mL, IntraVENous, PRN  glucagon (rDNA) injection 1 mg, 1 mg, IntraMUSCular, PRN  dextrose 5 % solution, 100 mL/hr, IntraVENous, PRN  insulin lispro (HUMALOG) injection vial 0-6 Units, 0-6 Units, SubCUTAneous, TID WC  insulin lispro (HUMALOG) injection vial 0-3 Units, 0-3 Units, SubCUTAneous, Nightly  apixaban (ELIQUIS) tablet 5 mg, 5 mg, Oral, BID  [START ON 5/30/2022] escitalopram (LEXAPRO) tablet 20 mg, 20 mg, Oral, Daily  famotidine (PEPCID) tablet 20 mg, 20 mg, Oral, BID  [START ON 5/30/2022] levothyroxine (SYNTHROID) tablet 150 mcg, 150 mcg, Oral, Daily  [START ON 5/30/2022] lisinopril (PRINIVIL;ZESTRIL) tablet 5 mg, 5 mg, Oral, Daily  QUEtiapine (SEROQUEL) tablet 25 mg, 25 mg, Oral, BID    Allergies   Allergen Reactions    Ciprofloxacin     Hydromorphone        Social History:    TOBACCO:   has no history on file for tobacco use. ETOH:   has no history on file for alcohol use. Patient currently lives independently  Environmental/chemical exposure: None known    Family History:   No family history on file. REVIEW OF SYSTEMS:    CONSTITUTIONAL:  negative for fevers, chills, diaphoresis, activity change, appetite change, fatigue, night sweats and unexpected weight change.    EYES: negative for blurred vision, eye discharge, visual disturbance and icterus  HEENT:  negative for hearing loss, tinnitus, ear drainage, sinus pressure, nasal congestion, epistaxis and snoring  RESPIRATORY:  See HPI  CARDIOVASCULAR:  negative for chest pain, palpitations, exertional chest pressure/discomfort, edema, syncope  GASTROINTESTINAL:  negative for nausea, vomiting, diarrhea, constipation, blood in stool and abdominal pain  GENITOURINARY:  negative for frequency, dysuria, urinary incontinence, decreased urine volume, and hematuria  HEMATOLOGIC/LYMPHATIC:  negative for easy bruising, bleeding and lymphadenopathy  ALLERGIC/IMMUNOLOGIC:  negative for recurrent infections, angioedema, anaphylaxis and drug reactions  ENDOCRINE:  negative for weight changes and diabetic symptoms including polyuria, polydipsia and polyphagia  MUSCULOSKELETAL:  negative for  pain, joint swelling, decreased range of motion and muscle weakness  NEUROLOGICAL:  negative for headaches, slurred speech, unilateral weakness  PSYCHIATRIC/BEHAVIORAL: negative for hallucinations, behavioral problems, confusion and agitation. Objective:   PHYSICAL EXAM:      VITALS:  /78   Pulse 89   Temp 98.3 °F (36.8 °C) (Oral)   Resp 19   Ht 5' 6\" (1.676 m)   Wt 145 lb (65.8 kg)   SpO2 100%   BMI 23.40 kg/m²      24HR INTAKE/OUTPUT:  No intake or output data in the 24 hours ending 05/29/22 0934  CONSTITUTIONAL:  awake, alert, cooperative, no apparent distress, and appears stated age  NECK: Tracheostomy in place  LUNGS:  no increased work of breathing and diminished breath sounds on the left hemithorax to auscultation. No accessory muscle use  CARDIOVASCULAR: S1 and S2, no edema and no JVD  ABDOMEN: PEG tube in place, normal bowel sounds, non-distended and no masses palpated, and no tenderness to palpation. No hepatospleenomegaly  LYMPHADENOPATHY:  no axillary or supraclavicular adenopathy.  No cervical adnenopathy  PSYCHIATRIC: Oriented to person place and time. No obvious depression or anxiety. MUSCULOSKELETAL: No obvious misalignment or effusion of the joints. No clubbing, cyanosis of the digits. SKIN:  normal skin color, texture, turgor and no redness, warmth, or swelling. No palpable nodules    DATA:    Old records have been reviewed    CBC:  Recent Labs     05/29/22 0706   WBC 10.1   RBC 3.14*   HGB 9.6*   HCT 28.9*      MCV 92.1   MCH 30.5   MCHC 33.1   RDW 15.8*   BANDSPCT 7      BMP:  Recent Labs     05/29/22  0706      K 4.8   CL 97*   CO2 33*   BUN 21*   CREATININE 0.8   CALCIUM 9.7   GLUCOSE 156*      ABG:  Recent Labs     05/29/22  0822   PHART 7.406   DID9QIG 59.1*   PO2ART 206.0*   BYU5IWE 37.1*   Q7ZNRSNP 100.0   BEART 10.7*     Procalcitonin  Recent Labs     05/29/22 0706   PROCAL 0.10       No results found for: BNP  Lab Results   Component Value Date    TROPONINI <0.01 05/29/2022           Radiology Review:  All pertinent images / reports were reviewed as a part of this visit. Assessment:     1. Acute hypoxemic respiratory failure  2. Atelectasis left lung  3. History of head and neck and lung cancer  4. Chronic tracheostomy      Plan:     I reviewed imaging for this patient. Chest x-ray reveals evidence of atelectasis of the left lung and volume loss. This is concerning for potential mucous plugging. CT imaging, however, shows widely patent trachea, left main bronchus, left upper lobe bronchus and left lower lobe bronchus. There appears to be severe bronchiectasis and fibrosis. There is no obvious proximal obstruction. There are no comparative images in our system. Review of reports from outside facility sounds similar to what were seeing. We will see if we can get images pushed through. He was hypoxemic on arrival  Now with T-piece in place and FiO2 0.7 his ABG is 7.4 1/59/206. Additionally he is not in any acute distress.   We will put him on scheduled nebulizer treatments and MetaNeb  He has been using the chest vest at the nursing home apparently    He is afebrile and does not have a leukocytosis  Procalcitonin is normal at 0.1  He does have some secretions coming through and around the tracheostomy tube. He is ordered vancomycin and cefepime  Send cultures however would not be surprising if he were colonized.

## 2022-05-29 NOTE — ED PROVIDER NOTES
Emergency Department Encounter    Patient: Heidi Pratt  MRN: [id number]  : 1950  Date of Evaluation: 2022  ED Provider:  Ruthie Narvaez MD    Triage Chief Complaint:   Shortness of Breath (Pt brought in by [facility] for CP, possible syncopal event, found unresponsive by staff at [phone] hrs, when EMS arrived Pt was sitting in a chair, pale/diaphoretic, nonverbal pointing at his chest that it hurts. 6/10 pain, hx of afib and DM. EKG showed ST per EMS.) and Chest Pain    [place]:  Heidi Pratt is a 71 y.o. male that presents to the ER for evaluation of increasing respiratory distress hypoxia is a history of recurrent aspiration pneumonia, status post recent hospitalization 1 month prior to [facility] where he had multiple bronchoscopies due to obstruction of his left upper and left lower lobe. Positive hypoxia on arrival history of tracheostomy due to tongue cancer history of lung cancer,    ROS - see HPI, below listed is current ROS at time of my eval:  Unable to fully obtained given patient's clinical condition    Past Medical History:   Diagnosis Date    Anemia     Anxiety     Aspiration pneumonia ([address]     Atrial fibrillation ([address]     CAD (coronary artery disease)     Cancer (HCC)     lung ca    Chronic kidney disease     Depression     Diabetes mellitus ([address]     GERD (gastroesophageal reflux disease)     History of malignant neoplasm of tongue     Hyperlipidemia     Hypertension     IBS (irritable bowel syndrome)     Pneumonia     Schizoaffective disorder (HCC)     Seizures ([address]     Thyroid disease      Past Surgical History:   Procedure Laterality Date    GASTROSTOMY TUBE PLACEMENT      TRACHEOSTOMY       No family history on file.   Social History     Socioeconomic History    Marital status:      Spouse name: Not on file    Number of children: Not on file    Years of education: Not on file    Highest education level: Not on file   Occupational History    Not on file   Tobacco Use    Smoking status: Not on file    Smokeless tobacco: Not on file   Substance and Sexual Activity    Alcohol use: Not on file    Drug use: Not on file    Sexual activity: Not on file   Other Topics Concern    Not on file   Social History Narrative    Not on file     Social Determinants of Health     Financial Resource Strain:     Difficulty of Paying Living Expenses: Not on file   Food Insecurity:     Worried About Running Out of Food in the Last Year: Not on file    Jennifer of Food in the Last Year: Not on file   Transportation Needs:     Lack of Transportation (Medical): Not on file    Lack of Transportation (Non-Medical):  Not on file   Physical Activity:     Days of Exercise per Week: Not on file    Minutes of Exercise per Session: Not on file   Stress:     Feeling of Stress : Not on file   Social Connections:     Frequency of Communication with Friends and Family: Not on file    Frequency of Social Gatherings with Friends and Family: Not on file    Attends [employer] Services: Not on file    Active Member of [employer] or Organizations: Not on file    Attends Club or Organization Meetings: Not on file    Marital Status: Not on file   Intimate Partner Violence:     Fear of Current or Ex-Partner: Not on file    Emotionally Abused: Not on file    Physically Abused: Not on file    Sexually Abused: Not on file   Housing Stability:     Unable to Pay for Housing in the Last Year: Not on file    Number of [place] in the Last Year: Not on file    Unstable Housing in the Last Year: Not on file     Current Facility-Administered Medications   Medication Dose Route Frequency Provider Last Rate Last Admin    sodium chloride flush 0.9 % injection 5-40 mL  5-40 mL IntraVENous 2 times per day Robbin Mason MD   10 mL at 05/29/22 1938    sodium chloride flush 0.9 % injection 5-40 mL  5-40 mL IntraVENous PRN Robbin Mason MD        0.9 % sodium chloride infusion   IntraVENous PRN Vinicio Alberts MD        ondansetron (ZOFRAN-ODT) disintegrating tablet 4 mg  4 mg Oral Q8H PRN Vinicio Alberts MD        Or    [facility]ron [facility]) injection 4 mg  4 mg IntraVENous Q6H PRN Vinicio Alberts MD        polyethylene glycol (GLYCOLAX) packet 17 g  17 g Oral Daily PRN Vinicio Alberts MD        acetaminophen (TYLENOL) tablet 650 mg  650 mg Oral Q6H PRN Vinicio Alberts MD   650 mg at 05/29/22 1937    Or    acetaminophen (TYLENOL) suppository 650 mg  650 mg Rectal Q6H PRN Vinicio Alberts MD        albuterol (PROVENTIL) nebulizer solution 2.5 mg  2.5 mg Nebulization Q6H PRN Vinicio Alberts MD        ipratropium-albuterol (DUONEB) nebulizer solution 1 ampule  1 ampule Inhalation Q4H PRN Vinicio Alberts MD        dextrose bolus 10% 125 mL  125 mL IntraVENous PRN Vinicio Alberts MD        Or    dextrose bolus 10% 250 mL  250 mL IntraVENous PRN Vinicio Alberts MD        glucagon (rDNA) injection 1 mg  1 mg IntraMUSCular PRN Vinicio Alberts MD        dextrose 5 % solution  100 mL/hr IntraVENous PRN Vinicio Alberts MD        insulin lispro (HUMALOG) injection vial 0-6 Units  0-6 Units SubCUTAneous TID WC Vinicio Alberts MD        insulin lispro (HUMALOG) injection vial 0-3 Units  0-3 Units SubCUTAneous Nightly Vinicio Alberts MD        apixaban Rodolfo Noe) tablet 5 mg  5 mg Oral BID Vinicio Alberts MD   5 mg at 05/29/22 1937    escitalopram (LEXAPRO) tablet 20 mg  20 mg PEG Tube Daily Vinicio Alberts MD        famotidine (PEPCID) tablet 20 mg  20 mg PEG Tube BID Vinicio Alberts MD   20 mg at 05/29/22 1937    levothyroxine (SYNTHROID) tablet 150 mcg  150 mcg PEG Tube Daily Vinicio Alberts MD        lisinopril (PRINIVIL;ZESTRIL) tablet 5 mg  5 mg PEG Tube Daily Vinicio Alberts MD        QUEtiapine (SEROQUEL) tablet 25 mg  25 mg PEG Tube BID Vinicio Alberts MD   25 mg at 05/29/22 182    ipratropium-albuterol (DUONEB) nebulizer solution 1 ampule  1 ampule Inhalation BID Dayron Chance MD   1 ampule at 05/29/22 2000    buPROPion [facility]) tablet 75 mg  75 mg PEG Tube BID Dayron Chance MD        valproic acid (DEPAKENE) 250 MG/5ML oral solution 400 mg  400 mg Per G Tube BID Dayron Chance MD   400 mg at 05/29/22 1936    lacosamide (VIMPAT) tablet 100 mg  100 mg PEG Tube BID Dayron Chance MD   100 mg at 05/29/22 1937    traZODone (DESYREL) tablet 50 mg  50 mg PEG Tube Nightly Dayron Chance MD   50 mg at 05/29/22 1937    traMADol (ULTRAM) tablet 50 mg  50 mg PEG Tube Q8H PRN Dayron Chance MD         Allergies   Allergen Reactions    Ciprofloxacin     Hydromorphone        Nursing Notes Reviewed    Physical Exam:  Triage VS:    ED Triage Vitals [05/29/22 0658]   Enc Vitals Group      /61      Heart Rate 98      Resp 20      Temp 98.3 °F (36.8 °C)      Temp Source Oral      SpO2 (!) 86 %      Weight 145 lb (65.8 kg)      Height 5' 6\" (1.676 m)      Head Circumference       Peak Flow       Pain Score       Pain Loc       Pain Edu? Excl. in [address]? My pulse ox interpretation is - abnormal    General appearance: In respiratory distress  Skin:  Warm. Dry. No pallor. No rash. Eye:  Normal conjuctiva. no Icterus. Ears, nose, mouth and throat:  Oral mucosa moist   Heart: Tachycardia, regular rhythm  Perfusion:  Within normal limits. Respiratory: Inspiratory and expiratory wheezing in all lung fields, decreased air entry, tachypnea with accessory muscle use  Abdominal:  Soft. Nontender. Non distended. Extremity:  No edema or tenderness  Neurological:  Alert and oriented,  No focal neuro deficits.     Pysch:  anxious    I have reviewed and interpreted all of the currently available lab results from this visit (if applicable):  Results for orders placed or performed during the hospital encounter of 05/29/22   CBC with Auto Differential   Result Value Ref Range    WBC 10.1 4.0 - 11.0 K/uL    RBC 3.14 (L) 4.20 - 5.90 M/uL    Hemoglobin 9.6 (L) 13.5 - 17.5 g/dL    Hematocrit 28.9 (L) 40.5 - 52.5 %    MCV 92.1 80.0 - 100.0 fL    MCH 30.5 26.0 - 34.0 pg    MCHC 33.1 31.0 - 36.0 g/dL    RDW 15.8 (H) 12.4 - 15.4 %    Platelets 412 175 - 524 K/uL    MPV 7.2 5.0 - 10.5 fL    PLATELET SLIDE REVIEW Adequate     SLIDE REVIEW see below     Neutrophils % 60.0 %    Lymphocytes % 18.0 %    Monocytes % 8.0 %    Eosinophils % 7.0 %    Basophils % 0.0 %    Neutrophils Absolute 6.8 1.7 - 7.7 K/uL    Lymphocytes Absolute 1.8 1.0 - 5.1 K/uL    Monocytes Absolute 0.8 0.0 - 1.3 K/uL    Eosinophils Absolute 0.7 (H) 0.0 - 0.6 K/uL    Basophils Absolute 0.0 0.0 - 0.2 K/uL    Bands Relative 7 0 - 7 %    Anisocytosis Occasional (A)     Polychromasia Occasional (A)    Comprehensive Metabolic Panel w/ Reflex to MG   Result Value Ref Range    Sodium 139 136 - 145 mmol/L    Potassium reflex Magnesium 4.8 3.5 - 5.1 mmol/L    Chloride 97 (L) 99 - 110 mmol/L    CO2 33 (H) 21 - 32 mmol/L    Anion Gap 9 3 - 16    Glucose 156 (H) 70 - 99 mg/dL    BUN 21 (H) 7 - 20 mg/dL    CREATININE 0.8 0.8 - 1.3 mg/dL    GFR Non-African American >60 >60    GFR African American >60 >60    Calcium 9.7 8.3 - 10.6 mg/dL    Total Protein 7.5 6.4 - 8.2 g/dL    Albumin 3.6 3.4 - 5.0 g/dL    Albumin/Globulin Ratio 0.9 (L) 1.1 - 2.2    Total Bilirubin <0.2 0.0 - 1.0 mg/dL    Alkaline Phosphatase 87 40 - 129 U/L    ALT 20 10 - 40 U/L    AST 21 15 - 37 U/L   Troponin   Result Value Ref Range    Troponin <0.01 <0.01 ng/mL   Brain Natriuretic Peptide   Result Value Ref Range    Pro- 0 - 124 pg/mL   Blood gas, arterial   Result Value Ref Range    pH, Arterial 7.406 7.350 - 7.450    pCO2, Arterial 59.1 (H) 35.0 - 45.0 mmHg    pO2, Arterial 206.0 (H) 75.0 - 108.0 mmHg    HCO3, Arterial 37.1 (H) 21.0 - 29.0 mmol/L    Base Excess, Arterial 10.7 (H) -3.0 - 3.0 mmol/L    Hemoglobin, Art, Extended 10.1 (L) 13.5 - 17.5 g/dL    O2 Sat, Arterial 100.0 >92 %    Carboxyhgb, Arterial 1.2 0.0 - 1.5 %    Methemoglobin, Arterial 0.0 <1.5 %    TCO2, Arterial 87.3 Not Established mmol/L    O2 Therapy Unknown    Lactate, Sepsis   Result Value Ref Range    Lactic Acid, Sepsis 1.2 0.4 - 1.9 mmol/L   Lactate, Sepsis   Result Value Ref Range    Lactic Acid, Sepsis 0.9 0.4 - 1.9 mmol/L   Procalcitonin   Result Value Ref Range    Procalcitonin 0.10 0.00 - 0.15 ng/mL   Urinalysis with Reflex to Culture    Specimen: Urine, clean catch   Result Value Ref Range    Color, UA Yellow Straw/Yellow    Clarity, UA Clear Clear    Glucose, Ur Negative Negative mg/dL    Bilirubin Urine Negative Negative    Ketones, Urine Negative Negative mg/dL    Specific Gravity, UA 1.020 1.005 - 1.030    Blood, Urine Negative Negative    pH, UA 7.0 5.0 - 8.0    Protein, UA Negative Negative mg/dL    Urobilinogen, Urine 1.0 <2.0 E.U./dL    Nitrite, Urine Negative Negative    Leukocyte Esterase, Urine Negative Negative    Microscopic Examination Not Indicated     Urine Type NotGiven     Urine Reflex to Culture Not Indicated    TSH with Reflex   Result Value Ref Range    TSH 14.41 (H) 0.27 - 4.20 uIU/mL   T4, Free   Result Value Ref Range    T4 Free 1.3 0.9 - 1.8 ng/dL   Basic Metabolic Panel w/ Reflex to MG   Result Value Ref Range    Sodium 138 136 - 145 mmol/L    Potassium reflex Magnesium 4.5 3.5 - 5.1 mmol/L    Chloride 93 (L) 99 - 110 mmol/L    CO2 34 (H) 21 - 32 mmol/L    Anion Gap 11 3 - 16    Glucose 76 70 - 99 mg/dL    BUN 18 7 - 20 mg/dL    CREATININE 0.6 (L) 0.8 - 1.3 mg/dL    GFR Non-African American >60 >60    GFR African American >60 >60    Calcium 9.9 8.3 - 10.6 mg/dL   CBC with Auto Differential   Result Value Ref Range    WBC 7.1 4.0 - 11.0 K/uL    RBC 3.33 (L) 4.20 - 5.90 M/uL    Hemoglobin 9.9 (L) 13.5 - 17.5 g/dL    Hematocrit 30.2 (L) 40.5 - 52.5 %    MCV 90.7 80.0 - 100.0 fL    MCH 29.8 26.0 - 34.0 pg    MCHC 32.8 31.0 - 36.0 g/dL    RDW 16.1 (H) 12.4 - 15.4 %    Platelets 497 018 - 778 K/uL    MPV 6.9 5.0 - 10.5 fL    PLATELET SLIDE REVIEW Adequate     SLIDE REVIEW see below     Neutrophils % 59.0 %    Lymphocytes % 9.0 %    Monocytes % 13.0 %    Eosinophils % 16.0 %    Basophils % 2.0 %    Neutrophils Absolute 4.3 1.7 - 7.7 K/uL    Lymphocytes Absolute 0.6 (L) 1.0 - 5.1 K/uL    Monocytes Absolute 0.9 0.0 - 1.3 K/uL    Eosinophils Absolute 1.1 (H) 0.0 - 0.6 K/uL    Basophils Absolute 0.1 0.0 - 0.2 K/uL    Metamyelocytes Relative 1 (A) %    Anisocytosis Occasional (A)    POCT Glucose   Result Value Ref Range    POC Glucose 146 (H) 70 - 99 mg/dl    Performed on ACCU-CHEK    POCT Glucose   Result Value Ref Range    POC Glucose 135 (H) 70 - 99 mg/dl    Performed on ACCU-CHEK    POCT Glucose   Result Value Ref Range    POC Glucose 110 (H) 70 - 99 mg/dl    Performed on ACCU-CHEK    POCT Glucose   Result Value Ref Range    POC Glucose 89 70 - 99 mg/dl    Performed on ACCU-CHEK    POCT Glucose   Result Value Ref Range    POC Glucose 84 70 - 99 mg/dl    Performed on ACCU-CHEK    EKG 12 Lead   Result Value Ref Range    Ventricular Rate 97 BPM    Atrial Rate 97 BPM    P-R Interval 168 ms    QRS Duration 80 ms    Q-T Interval 360 ms    QTc Calculation (Bazett) 457 ms    P Axis -1 degrees    R Axis -41 degrees    T Axis 48 degrees    Diagnosis       Normal sinus rhythmBaseline artifactLeft axis deviationSeptal infarctAbnormal ECGConfirmed by JEAN CLAUDE Royal MD ([id number]) on 5/29/2022 1:02:06 PM      Radiographs (if obtained):  Radiologist's Report Reviewed:  XR CHEST PORTABLE    Result Date: 5/29/2022  EXAMINATION: ONE XRAY VIEW OF THE CHEST 5/29/2022 6:59 am COMPARISON: None. HISTORY: ORDERING SYSTEM PROVIDED HISTORY: SOB TECHNOLOGIST PROVIDED HISTORY: Reason for exam:->SOB Reason for Exam: Shortness of Breath (Pt brought in by [facility] for CP, possible syncopal event, found unresponsive by staff at [id number] hrs, when EMS arrived Pt was sitting in a chair, pale/diaphoretic, nonverbal pointing at his chest that it hurts. 6/10 pain, hx of afib and DM. EKG showed ST per EMS. ) FINDINGS: A tracheostomy tube is present. There is complete opacification of the left hemithorax. The right lung is well aerated. Heart and mediastinum obscured by airspace changes on the left. No significant skeletal finding. Complete opacification of the left hemithorax. EKG (if obtained): (All EKG's are interpreted by myself in the absence of a cardiologist)      MDM:  Chronically ill-appearing male with likely persistent obstruction of left upper and left middle and left lower lobe, with persistent opacification, consultation with pulmonology as well as admitting hospitalist were performed chart review performed in full. IV fluid resuscitation supplemental oxygen, blood cultures performed and treatment for empiric nosocomial pneumonia and persistent possible obstructive pneumonia. Clinical Impression:  1. Hypoxia    2. Pneumonia of left lower lobe due to infectious organism      Disposition referral (if applicable):  No follow-up provider specified. Disposition medications (if applicable):  Current Discharge Medication List          Comment: Please note this report has been produced using speech recognition software and may contain errors related to that system including errors in grammar, punctuation, and spelling, as well as words and phrases that may be inappropriate. Efforts were made to edit the dictations.      Paulina Ingram MD  83/67/34 [id number]

## 2022-05-30 LAB
ANION GAP SERPL CALCULATED.3IONS-SCNC: 11 MMOL/L (ref 3–16)
ANISOCYTOSIS: ABNORMAL
BASOPHILS ABSOLUTE: 0.1 K/UL (ref 0–0.2)
BASOPHILS RELATIVE PERCENT: 2 %
BUN BLDV-MCNC: 18 MG/DL (ref 7–20)
CALCIUM SERPL-MCNC: 9.9 MG/DL (ref 8.3–10.6)
CHLORIDE BLD-SCNC: 93 MMOL/L (ref 99–110)
CO2: 34 MMOL/L (ref 21–32)
CREAT SERPL-MCNC: 0.6 MG/DL (ref 0.8–1.3)
EOSINOPHILS ABSOLUTE: 1.1 K/UL (ref 0–0.6)
EOSINOPHILS RELATIVE PERCENT: 16 %
ESTIMATED AVERAGE GLUCOSE: 116.9 MG/DL
GFR AFRICAN AMERICAN: >60
GFR NON-AFRICAN AMERICAN: >60
GLUCOSE BLD-MCNC: 126 MG/DL (ref 70–99)
GLUCOSE BLD-MCNC: 76 MG/DL (ref 70–99)
GLUCOSE BLD-MCNC: 81 MG/DL (ref 70–99)
GLUCOSE BLD-MCNC: 84 MG/DL (ref 70–99)
GLUCOSE BLD-MCNC: 96 MG/DL (ref 70–99)
HBA1C MFR BLD: 5.7 %
HCT VFR BLD CALC: 30.2 % (ref 40.5–52.5)
HEMOGLOBIN: 9.9 G/DL (ref 13.5–17.5)
LYMPHOCYTES ABSOLUTE: 0.6 K/UL (ref 1–5.1)
LYMPHOCYTES RELATIVE PERCENT: 9 %
MCH RBC QN AUTO: 29.8 PG (ref 26–34)
MCHC RBC AUTO-ENTMCNC: 32.8 G/DL (ref 31–36)
MCV RBC AUTO: 90.7 FL (ref 80–100)
METAMYELOCYTES RELATIVE PERCENT: 1 %
MONOCYTES ABSOLUTE: 0.9 K/UL (ref 0–1.3)
MONOCYTES RELATIVE PERCENT: 13 %
NEUTROPHILS ABSOLUTE: 4.3 K/UL (ref 1.7–7.7)
NEUTROPHILS RELATIVE PERCENT: 59 %
PDW BLD-RTO: 16.1 % (ref 12.4–15.4)
PERFORMED ON: ABNORMAL
PERFORMED ON: NORMAL
PLATELET # BLD: 312 K/UL (ref 135–450)
PLATELET SLIDE REVIEW: ADEQUATE
PMV BLD AUTO: 6.9 FL (ref 5–10.5)
POTASSIUM REFLEX MAGNESIUM: 4.5 MMOL/L (ref 3.5–5.1)
RBC # BLD: 3.33 M/UL (ref 4.2–5.9)
SLIDE REVIEW: ABNORMAL
SODIUM BLD-SCNC: 138 MMOL/L (ref 136–145)
WBC # BLD: 7.1 K/UL (ref 4–11)

## 2022-05-30 PROCEDURE — 36415 COLL VENOUS BLD VENIPUNCTURE: CPT

## 2022-05-30 PROCEDURE — 87205 SMEAR GRAM STAIN: CPT

## 2022-05-30 PROCEDURE — 87186 SC STD MICRODIL/AGAR DIL: CPT

## 2022-05-30 PROCEDURE — 80048 BASIC METABOLIC PNL TOTAL CA: CPT

## 2022-05-30 PROCEDURE — 2700000000 HC OXYGEN THERAPY PER DAY

## 2022-05-30 PROCEDURE — 94761 N-INVAS EAR/PLS OXIMETRY MLT: CPT

## 2022-05-30 PROCEDURE — 99233 SBSQ HOSP IP/OBS HIGH 50: CPT | Performed by: INTERNAL MEDICINE

## 2022-05-30 PROCEDURE — 6360000002 HC RX W HCPCS: Performed by: INTERNAL MEDICINE

## 2022-05-30 PROCEDURE — 6370000000 HC RX 637 (ALT 250 FOR IP): Performed by: INTERNAL MEDICINE

## 2022-05-30 PROCEDURE — 85025 COMPLETE CBC W/AUTO DIFF WBC: CPT

## 2022-05-30 PROCEDURE — 94640 AIRWAY INHALATION TREATMENT: CPT

## 2022-05-30 PROCEDURE — 2580000003 HC RX 258: Performed by: INTERNAL MEDICINE

## 2022-05-30 PROCEDURE — 87077 CULTURE AEROBIC IDENTIFY: CPT

## 2022-05-30 PROCEDURE — 87181 SC STD AGAR DILUTION PER AGT: CPT

## 2022-05-30 PROCEDURE — 1200000000 HC SEMI PRIVATE

## 2022-05-30 PROCEDURE — 87070 CULTURE OTHR SPECIMN AEROBIC: CPT

## 2022-05-30 RX ADMIN — TRAZODONE HYDROCHLORIDE 50 MG: 50 TABLET ORAL at 20:20

## 2022-05-30 RX ADMIN — ESCITALOPRAM OXALATE 20 MG: 10 TABLET ORAL at 08:48

## 2022-05-30 RX ADMIN — LEVOTHYROXINE SODIUM 150 MCG: 0.1 TABLET ORAL at 08:51

## 2022-05-30 RX ADMIN — SODIUM CHLORIDE, PRESERVATIVE FREE 10 ML: 5 INJECTION INTRAVENOUS at 08:54

## 2022-05-30 RX ADMIN — POLYETHYLENE GLYCOL 3350 17 G: 17 POWDER, FOR SOLUTION ORAL at 16:50

## 2022-05-30 RX ADMIN — APIXABAN 5 MG: 5 TABLET, FILM COATED ORAL at 20:19

## 2022-05-30 RX ADMIN — FAMOTIDINE 20 MG: 20 TABLET, FILM COATED ORAL at 08:48

## 2022-05-30 RX ADMIN — LISINOPRIL 5 MG: 10 TABLET ORAL at 08:48

## 2022-05-30 RX ADMIN — ONDANSETRON 4 MG: 2 INJECTION INTRAMUSCULAR; INTRAVENOUS at 20:20

## 2022-05-30 RX ADMIN — LACOSAMIDE 100 MG: 100 TABLET, FILM COATED ORAL at 08:48

## 2022-05-30 RX ADMIN — QUETIAPINE FUMARATE 25 MG: 25 TABLET ORAL at 18:44

## 2022-05-30 RX ADMIN — SODIUM CHLORIDE, PRESERVATIVE FREE 10 ML: 5 INJECTION INTRAVENOUS at 20:20

## 2022-05-30 RX ADMIN — IPRATROPIUM BROMIDE AND ALBUTEROL SULFATE 1 AMPULE: .5; 3 SOLUTION RESPIRATORY (INHALATION) at 20:15

## 2022-05-30 RX ADMIN — LACOSAMIDE 100 MG: 100 TABLET, FILM COATED ORAL at 20:20

## 2022-05-30 RX ADMIN — APIXABAN 5 MG: 5 TABLET, FILM COATED ORAL at 08:48

## 2022-05-30 RX ADMIN — IPRATROPIUM BROMIDE AND ALBUTEROL SULFATE 1 AMPULE: .5; 3 SOLUTION RESPIRATORY (INHALATION) at 09:18

## 2022-05-30 RX ADMIN — VALPROIC ACID 400 MG: 250 SOLUTION ORAL at 08:49

## 2022-05-30 RX ADMIN — VALPROIC ACID 400 MG: 250 SOLUTION ORAL at 20:19

## 2022-05-30 RX ADMIN — BUPROPION HYDROCHLORIDE 75 MG: 75 TABLET, FILM COATED ORAL at 20:19

## 2022-05-30 RX ADMIN — QUETIAPINE FUMARATE 25 MG: 25 TABLET ORAL at 13:01

## 2022-05-30 RX ADMIN — FAMOTIDINE 20 MG: 20 TABLET, FILM COATED ORAL at 20:19

## 2022-05-30 ASSESSMENT — PAIN SCALES - GENERAL
PAINLEVEL_OUTOF10: 0

## 2022-05-30 NOTE — PROGRESS NOTES
P Pulmonary and Critical Care   Progress Note      Reason for Consult: Left lung atelectasis  Requesting Physician: Dr. Gramajo Single:   [address] [facility]:                The patient is a 71 y.o. male with significant past medical history of:      Diagnosis Date    Anemia     Anxiety     [facility]     Atrial fibrillation ([facility]     CAD (coronary artery disease)     Cancer ([facility]     lung ca    Chronic kidney disease     Depression     Diabetes mellitus ([facility]     GERD (gastroesophageal reflux disease)     History of malignant neoplasm of tongue     Hyperlipidemia     Hypertension     IBS (irritable bowel syndrome)     Pneumonia     Schizoaffective disorder (HCC)     Seizures (HCC)     Thyroid disease      Interval history: Patient is in no respiratory distress. He is alert and cooperative.      Past Surgical History:        Procedure Laterality Date    GASTROSTOMY TUBE PLACEMENT      TRACHEOSTOMY       Current Medications:    Current Facility-Administered Medications: sodium chloride flush 0.9 % injection 5-40 mL, 5-40 mL, IntraVENous, 2 times per day  sodium chloride flush 0.9 % injection 5-40 mL, 5-40 mL, IntraVENous, PRN  0.9 % sodium chloride infusion, , IntraVENous, PRN  ondansetron (ZOFRAN-ODT) disintegrating tablet 4 mg, 4 mg, Oral, Q8H PRN **OR** ondansetron (ZOFRAN) injection 4 mg, 4 mg, IntraVENous, Q6H PRN  polyethylene glycol (GLYCOLAX) packet 17 g, 17 g, Oral, Daily PRN  acetaminophen (TYLENOL) tablet 650 mg, 650 mg, Oral, Q6H PRN **OR** acetaminophen (TYLENOL) suppository 650 mg, 650 mg, Rectal, Q6H PRN  albuterol (PROVENTIL) nebulizer solution 2.5 mg, 2.5 mg, Nebulization, Q6H PRN  ipratropium-albuterol (DUONEB) nebulizer solution 1 ampule, 1 ampule, Inhalation, Q4H PRN  dextrose bolus 10% 125 mL, 125 mL, IntraVENous, PRN **OR** dextrose bolus 10% 250 mL, 250 mL, IntraVENous, PRN  glucagon (rDNA) injection 1 mg, 1 mg, IntraMUSCular, PRN  dextrose 5 % solution, 100 mL/hr, IntraVENous, PRN  insulin lispro (HUMALOG) injection vial 0-6 Units, 0-6 Units, SubCUTAneous, TID WC  insulin lispro (HUMALOG) injection vial 0-3 Units, 0-3 Units, SubCUTAneous, Nightly  apixaban (ELIQUIS) tablet 5 mg, 5 mg, Oral, BID  escitalopram (LEXAPRO) tablet 20 mg, 20 mg, PEG Tube, Daily  famotidine (PEPCID) tablet 20 mg, 20 mg, PEG Tube, BID  levothyroxine (SYNTHROID) tablet 150 mcg, 150 mcg, PEG Tube, Daily  lisinopril (PRINIVIL;ZESTRIL) tablet 5 mg, 5 mg, PEG Tube, Daily  QUEtiapine (SEROQUEL) tablet 25 mg, 25 mg, PEG Tube, BID  ipratropium-albuterol (DUONEB) nebulizer solution 1 ampule, 1 ampule, Inhalation, BID  buPROPion (WELLBUTRIN) tablet 75 mg, 75 mg, PEG Tube, BID  valproic acid (DEPAKENE) 250 MG/5ML oral solution 400 mg, 400 mg, Per G Tube, BID  lacosamide (VIMPAT) tablet 100 mg, 100 mg, PEG Tube, BID  traZODone (DESYREL) tablet 50 mg, 50 mg, PEG Tube, Nightly  traMADol (ULTRAM) tablet 50 mg, 50 mg, PEG Tube, Q8H PRN    Allergies   Allergen Reactions    Ciprofloxacin     Hydromorphone        Social History:    TOBACCO:   has no history on file for tobacco use. ETOH:   has no history on file for alcohol use. Patient currently lives independently  Environmental/chemical exposure: None known    Family History:   No family history on file. REVIEW OF SYSTEMS:    CONSTITUTIONAL:  negative for fevers, chills, diaphoresis, activity change, appetite change, fatigue, night sweats and unexpected weight change.    EYES:  negative for blurred vision, eye discharge, visual disturbance and icterus  HEENT:  negative for hearing loss, tinnitus, ear drainage, sinus pressure, nasal congestion, epistaxis and snoring  RESPIRATORY:  See HPI  CARDIOVASCULAR:  negative for chest pain, palpitations, exertional chest pressure/discomfort, edema, syncope  GASTROINTESTINAL:  negative for nausea, vomiting, diarrhea, constipation, blood in stool and abdominal pain  GENITOURINARY:  negative for frequency, dysuria, urinary incontinence, decreased urine volume, and hematuria  HEMATOLOGIC/LYMPHATIC:  negative for easy bruising, bleeding and lymphadenopathy  ALLERGIC/IMMUNOLOGIC:  negative for recurrent infections, angioedema, anaphylaxis and drug reactions  ENDOCRINE:  negative for weight changes and diabetic symptoms including polyuria, polydipsia and polyphagia  MUSCULOSKELETAL:  negative for  pain, joint swelling, decreased range of motion and muscle weakness  NEUROLOGICAL:  negative for headaches, slurred speech, unilateral weakness  PSYCHIATRIC/BEHAVIORAL: negative for hallucinations, behavioral problems, confusion and agitation. Objective:   PHYSICAL EXAM:      VITALS:  /67   Pulse 89   Temp 97.1 °F (36.2 °C) (Temporal)   Resp 15   Ht 5' 6\" (1.676 m)   Wt 144 lb 2.9 oz (65.4 kg)   SpO2 98%   BMI 23.27 kg/m²      24HR INTAKE/OUTPUT:      Intake/Output Summary (Last 24 hours) at 5/30/2022 0858  Last data filed at 5/30/2022 0510  Gross per 24 hour   Intake 101.6 ml   Output 950 ml   Net -848. 4 ml     CONSTITUTIONAL:  awake, alert, cooperative, no apparent distress, and appears stated age  NECK: Tracheostomy in place  LUNGS:  no increased work of breathing and diminished breath sounds on the left hemithorax to auscultation. No accessory muscle use  CARDIOVASCULAR: S1 and S2, no edema and no JVD  ABDOMEN: PEG tube in place, normal bowel sounds, non-distended and no masses palpated, and no tenderness to palpation. No hepatospleenomegaly  LYMPHADENOPATHY:  no axillary or supraclavicular adenopathy. No cervical adnenopathy  PSYCHIATRIC: Oriented to person place and time. No obvious depression or anxiety. MUSCULOSKELETAL: No obvious misalignment or effusion of the joints. No clubbing, cyanosis of the digits. SKIN:  normal skin color, texture, turgor and no redness, warmth, or swelling.  No palpable nodules    DATA:    Old records have been reviewed    CBC:  Recent Labs     05/29/22  0706 05/30/22  5963 WBC 10.1 7.1   RBC 3.14* 3.33*   HGB 9.6* 9.9*   HCT 28.9* 30.2*    312   MCV 92.1 90.7   MCH 30.5 29.8   MCHC 33.1 32.8   RDW 15.8* 16.1*   BANDSPCT 7  --       BMP:  Recent Labs     05/29/22  0706 05/30/22  0451    138   K 4.8 4.5   CL 97* 93*   CO2 33* 34*   BUN 21* 18   CREATININE 0.8 0.6*   CALCIUM 9.7 9.9   GLUCOSE 156* 76      ABG:  Recent Labs     05/29/22  0822   PHART 7.406   YOU2PQG 59.1*   PO2ART 206.0*   POG1ZFE 37.1*   [id number] 100.0   BEART 10.7*     Procalcitonin  Recent Labs     05/29/22  0706   PROCAL 0.10       No results found for: BNP  Lab Results   Component Value Date    TROPONINI <0.01 05/29/2022           Radiology Review:  All pertinent images / reports were reviewed as a part of this visit. Assessment:     1. Acute hypoxemic respiratory failure  2. Atelectasis left lung  3. History of head and neck and lung cancer  4. Chronic tracheostomy      Plan:     I reviewed imaging for this patient. Chest x-ray reveals evidence of atelectasis of the left lung and volume loss. This is concerning for potential mucous plugging. CT imaging, however, shows widely patent trachea, left main bronchus, left upper lobe bronchus and left lower lobe bronchus. There appears to be severe bronchiectasis and fibrosis. There is no obvious proximal obstruction. Bronchoscopy yesterday was negative for obstructing endobronchial lesion, foreign body or mucous plugging. I think imaging reflects his baseline  No evidence of active infection. No fever.   Procalcitonin and WBC are normal  Not on antibiotics  Titrate oxygen to keep saturations in the low to mid 90s  Can move out of ICU  Anticipate discharge back to [facility] soon

## 2022-05-30 NOTE — PROGRESS NOTES
therapy  Supplemental oxygen as needed to keep sats above 90%     Hx of A. fib: On Eliquis     Hypertension: Continue lisinopril. Monitor closely     Hx of diabetes mellitus: Hold oral antidiabetic medications  Hemoglobin A1c ordered. Start SSI. Monitor glucose closely     Hx of hypothyroidism: Continue Synthroid.      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 (baseline 6L)      Hx of PEG tube:  Improving discharge around PEG tube  GI consulted for evaluation  Dietary consulted for tube feeds    DVT PPX: eliquis    Code:Full Code  Diet: Diet NPO    Disposition: Back to SNF when medically stable    Current Medications  sodium chloride flush 0.9 % injection 5-40 mL, 2 times per day  sodium chloride flush 0.9 % injection 5-40 mL, PRN  0.9 % sodium chloride infusion, PRN  ondansetron (ZOFRAN-ODT) disintegrating tablet 4 mg, Q8H PRN   Or  ondansetron (ZOFRAN) injection 4 mg, Q6H PRN  polyethylene glycol (GLYCOLAX) packet 17 g, Daily PRN  acetaminophen (TYLENOL) tablet 650 mg, Q6H PRN   Or  acetaminophen (TYLENOL) suppository 650 mg, Q6H PRN  albuterol (PROVENTIL) nebulizer solution 2.5 mg, Q6H PRN  ipratropium-albuterol (DUONEB) nebulizer solution 1 ampule, Q4H PRN  dextrose bolus 10% 125 mL, PRN   Or  dextrose bolus 10% 250 mL, PRN  glucagon (rDNA) injection 1 mg, PRN  dextrose 5 % solution, PRN  insulin lispro (HUMALOG) injection vial 0-6 Units, TID WC  insulin lispro (HUMALOG) injection vial 0-3 Units, Nightly  apixaban (ELIQUIS) tablet 5 mg, BID  escitalopram (LEXAPRO) tablet 20 mg, Daily  famotidine (PEPCID) tablet 20 mg, BID  levothyroxine (SYNTHROID) tablet 150 mcg, Daily  lisinopril (PRINIVIL;ZESTRIL) tablet 5 mg, Daily  QUEtiapine (SEROQUEL) tablet 25 mg, BID  ipratropium-albuterol (DUONEB) nebulizer solution 1 ampule, BID  buPROPion (WELLBUTRIN) tablet 75 mg, BID  valproic acid (DEPAKENE) 250 MG/5ML oral solution 400 mg, BID  lacosamide (VIMPAT) tablet 100 mg, BID  traZODone (DESYREL) tablet 50 mg, Nightly  traMADol (ULTRAM) tablet 50 mg, Q8H PRN        Objective:  /67   Pulse (!) 103   Temp 96.9 °F (36.1 °C) (Temporal)   Resp 20   Ht 5' 6\" (1.676 m)   Wt 144 lb 2.9 oz (65.4 kg)   SpO2 98%   BMI 23.27 kg/m²     Intake/Output Summary (Last 24 hours) at 5/30/2022 0958  Last data filed at 5/30/2022 0510  Gross per 24 hour   Intake 101.6 ml   Output 950 ml   Net -848. 4 ml      Wt Readings from Last 3 Encounters:   05/30/22 144 lb 2.9 oz (65.4 kg)       Physical Examination:   General appearance: I awake and alert in no apparent distress  HEENT Normal cephalic, atraumatic without obvious deformity. Trach in place  Lungs: No increased work of breathing. Diminished breath sound on left hemithorax  Heart: Regular rate and rhythm with Normal S1/S2 without murmurs  Abdomen: Soft, non-tender, nondistended normal BS. PEG in place  Extremities: No clubbing, cyanosis, or edema bilaterally. Full range of motion without deformity and normal gait intact. Skin: Skin color, texture, turgor normal.  No rashes or lesions. Neurologic: Alert and oriented  Cranial nerves: II-XII intact, grossly non-focal.  Psychiatry: Calm, awake and alert  Skin: Warm, dry, normal turgor, no rash  Brisk capillary refill, peripheral pulses palpable   Labs and Tests:  CBC:   Recent Labs     05/29/22  0706 05/30/22  0451   WBC 10.1 7.1   HGB 9.6* 9.9*    312     BMP:    Recent Labs     05/29/22  0706 05/30/22  0451    138   K 4.8 4.5   CL 97* 93*   CO2 33* 34*   BUN 21* 18   CREATININE 0.8 0.6*   GLUCOSE 156* 76     Hepatic:   Recent Labs     05/29/22  0706   AST 21   ALT 20   BILITOT <0.2   ALKPHOS 87     CT CHEST W CONTRAST   Final Result   1. Near complete opacification of the left hemithorax. Etiology is not   determined.   Aspiration may be considered, although no obvious airway   obstruction is observed on the current exam.  Unknown history of prior cancer   in the provided clinical record. Correlate with any potential oncologic or   treatment history. 2. Scattered ground-glass opacities throughout the right lung. A developing   pattern of pneumonitis may be considered clinically as well. 3. Mildly prominent mediastinal lymph nodes. 4. There are sclerotic changes within several cervical and thoracic vertebral   bodies. These also could be due to prior treatment. Metastatic disease can   not be excluded with a known history of malignancy. Underlying degenerative   changes considered less likely. XR CHEST PORTABLE   Final Result   Complete opacification of the left hemithorax. Problem List  Principal Problem:    Acute hypoxemic respiratory failure (HCC)  Active Problems:    Bronchiectasis with acute exacerbation (HCC)    History of lung cancer    Tracheostomy dependent ([facility]  Resolved Problems:    * No resolved hospital problems.  Darrion Multani MD   5/30/2022 9:58 AM

## 2022-05-30 NOTE — PROGRESS NOTES
Trach care done. Inner cannula cleaned and replaced. Drain gauze replaced. Site was cleansed and dried. Water changed and drain bag emptied on cool mist aerosol. FiO2 lowered to 35% per Dr. Marie Weinstein to decrease the oxygen. Patient tolerated well.

## 2022-05-30 NOTE — PROGRESS NOTES
Spoke with daughter Zach at this time. Updated on plan of care and patient status. No further questions at this time.

## 2022-05-30 NOTE — PROGRESS NOTES
Nutrition Note    RECOMMENDATIONS  PO Diet: NPO  Nutrition Support:   1. Recommend order \"Diet: Adult Tube Feed\". Initiate Two tino HN (2.0 formula) at 25 mL/hr and as tolerated, increase by 15 mL/hr q 4 hours until goal of 40 mL/hr. 2. Recommend 160 mL H20 q 3 hours. Increase flush if Na+ increases greater than 145 mEq/L.  3. Ensure head of bed is 30 - 45 degrees during continuous or bolus gastric feeding and for one hour after bolus. Turn off the feeding if head of bed is lowered less than 30 degrees. 4. Monitor for tolerance (bowel habits, N/V, cramping, abdominal exam findings: distended, firm, tense, guarded, discomfort). 5. Irrigate tube with 30 mL water before, between and after each medication. 6. If tube becomes clogged, first try flushing with water. If water does not unclog the tube, may use clog zapper at the direction of the physician/LIP. If tube remains clogged, contact Physician/LIP for additional orders. NUTRITION ASSESSMENT   Pt is NPO d/t malignant tongue cancer with mets to lung. No wt hx available to determine significant changes. Pt has chronic trach & PEG, & receives Two tino HN @ 35 ml/hr. See above for TF rec's. Will monitor for tolerance.       Nutrition Related Findings: No edema noted   Wounds: None   Nutrition Education:  Education not indicated    Nutrition Goals: Initiate nutrition support,Tolerate nutrition support at goal rate     MALNUTRITION ASSESSMENT   Chronic Illness  Malnutrition Status: Insufficient data    NUTRITION DIAGNOSIS   · Inadequate oral intake related to altered GI structure as evidenced by nutrition support - enteral nutrition        CURRENT NUTRITION THERAPIES  Diet NPO     PO Intake: NPO   PO Supplement Intake:NPO    Current Tube Feeding (TF) Orders:  · Feeding Route: PEG  · Formula: 2.0 Calorie  · Schedule: Continuous  · Feeding Regimen per NH: Two tino HN @ 35 ml/hr provides 840 ml; 1680 kcal, 70 g pro & 588 ml free water  · Goal TF & Flush Orders Provides: Two tino HN @ 40 ml/hr to provide 960 ml; 1920 kcal, 80 g pro & 672 ml free water. Additional 160 ml water flush q 3 hr for fluid needs. ANTHROPOMETRICS   Current Height: 5' 6\" (167.6 cm)   Current Weight: 144 lb 2.9 oz (65.4 kg)     Ideal Body Weight (IBW): 142 lbs  (65 kg)        BMI: 23.3      COMPARATIVE STANDARDS  Energy (kcal):  1625- 1950     Protein (g):  78- 130g       Fluid (mL/day):  1 ml/kcal    The patient will be monitored per nutrition standards of care. Consult dietitian if additional nutrition interventions are needed prior to RD reassessment.      Prosper Holland RD, LD    Contact: [phone]

## 2022-05-30 NOTE — PLAN OF CARE
Problem: Discharge Planning  Goal: Discharge to home or other facility with appropriate resources  Outcome: Progressing  Flowsheets (Taken 5/29/2022 2100)  Discharge to home or other facility with appropriate resources: Identify barriers to discharge with patient and caregiver     Problem: Pain  Goal: Verbalizes/displays adequate comfort level or baseline comfort level  Outcome: Progressing  Flowsheets  Taken 5/30/2022 0400  Verbalizes/displays adequate comfort level or baseline comfort level: Encourage patient to monitor pain and request assistance  Taken 5/30/2022 0000  Verbalizes/displays adequate comfort level or baseline comfort level: Encourage patient to monitor pain and request assistance  Taken 5/29/2022 2100  Verbalizes/displays adequate comfort level or baseline comfort level: Encourage patient to monitor pain and request assistance     Problem: Skin/Tissue Integrity  Goal: Absence of new skin breakdown  Description: 1. Monitor for areas of redness and/or skin breakdown  2. Assess vascular access sites hourly  3. Every 4-6 hours minimum:  Change oxygen saturation probe site  4. Every 4-6 hours:  If on nasal continuous positive airway pressure, respiratory therapy assess nares and determine need for appliance change or resting period.   Outcome: Progressing     Problem: Safety - Adult  Goal: Free from fall injury  Outcome: Progressing  Flowsheets (Taken 5/30/2022 0410)  Free From Fall Injury: Instruct family/caregiver on patient safety

## 2022-05-31 ENCOUNTER — APPOINTMENT (OUTPATIENT)
Dept: GENERAL RADIOLOGY | Age: 72
DRG: 189 | End: 2022-05-31
Payer: MEDICARE

## 2022-05-31 VITALS
RESPIRATION RATE: 16 BRPM | DIASTOLIC BLOOD PRESSURE: 78 MMHG | BODY MASS INDEX: 23.07 KG/M2 | HEIGHT: 66 IN | HEART RATE: 88 BPM | TEMPERATURE: 97.6 F | SYSTOLIC BLOOD PRESSURE: 131 MMHG | WEIGHT: 143.52 LBS | OXYGEN SATURATION: 100 %

## 2022-05-31 LAB
ANION GAP SERPL CALCULATED.3IONS-SCNC: 12 MMOL/L (ref 3–16)
ANISOCYTOSIS: ABNORMAL
BASOPHILS ABSOLUTE: 0 K/UL (ref 0–0.2)
BASOPHILS RELATIVE PERCENT: 0 %
BUN BLDV-MCNC: 23 MG/DL (ref 7–20)
CALCIUM SERPL-MCNC: 9.4 MG/DL (ref 8.3–10.6)
CHLORIDE BLD-SCNC: 94 MMOL/L (ref 99–110)
CO2: 30 MMOL/L (ref 21–32)
CREAT SERPL-MCNC: 0.7 MG/DL (ref 0.8–1.3)
EOSINOPHILS ABSOLUTE: 1.2 K/UL (ref 0–0.6)
EOSINOPHILS RELATIVE PERCENT: 16 %
GFR AFRICAN AMERICAN: >60
GFR NON-AFRICAN AMERICAN: >60
GLUCOSE BLD-MCNC: 137 MG/DL (ref 70–99)
GLUCOSE BLD-MCNC: 147 MG/DL (ref 70–99)
GLUCOSE BLD-MCNC: 166 MG/DL (ref 70–99)
HCT VFR BLD CALC: 28.7 % (ref 40.5–52.5)
HEMOGLOBIN: 9.4 G/DL (ref 13.5–17.5)
LYMPHOCYTES ABSOLUTE: 2 K/UL (ref 1–5.1)
LYMPHOCYTES RELATIVE PERCENT: 26 %
MCH RBC QN AUTO: 29.7 PG (ref 26–34)
MCHC RBC AUTO-ENTMCNC: 32.8 G/DL (ref 31–36)
MCV RBC AUTO: 90.4 FL (ref 80–100)
MONOCYTES ABSOLUTE: 0.5 K/UL (ref 0–1.3)
MONOCYTES RELATIVE PERCENT: 7 %
NEUTROPHILS ABSOLUTE: 3.9 K/UL (ref 1.7–7.7)
NEUTROPHILS RELATIVE PERCENT: 51 %
PDW BLD-RTO: 15.9 % (ref 12.4–15.4)
PERFORMED ON: ABNORMAL
PERFORMED ON: ABNORMAL
PLATELET # BLD: 326 K/UL (ref 135–450)
PLATELET SLIDE REVIEW: ADEQUATE
PMV BLD AUTO: 6.8 FL (ref 5–10.5)
POLYCHROMASIA: ABNORMAL
POTASSIUM REFLEX MAGNESIUM: 5 MMOL/L (ref 3.5–5.1)
RBC # BLD: 3.18 M/UL (ref 4.2–5.9)
SLIDE REVIEW: ABNORMAL
SODIUM BLD-SCNC: 136 MMOL/L (ref 136–145)
STREP PNEUMONIAE ANTIGEN, URINE: NORMAL
WBC # BLD: 7.7 K/UL (ref 4–11)

## 2022-05-31 PROCEDURE — 6370000000 HC RX 637 (ALT 250 FOR IP): Performed by: INTERNAL MEDICINE

## 2022-05-31 PROCEDURE — 36415 COLL VENOUS BLD VENIPUNCTURE: CPT

## 2022-05-31 PROCEDURE — 97535 SELF CARE MNGMENT TRAINING: CPT

## 2022-05-31 PROCEDURE — 71045 X-RAY EXAM CHEST 1 VIEW: CPT

## 2022-05-31 PROCEDURE — 94761 N-INVAS EAR/PLS OXIMETRY MLT: CPT

## 2022-05-31 PROCEDURE — 94640 AIRWAY INHALATION TREATMENT: CPT

## 2022-05-31 PROCEDURE — 99233 SBSQ HOSP IP/OBS HIGH 50: CPT | Performed by: INTERNAL MEDICINE

## 2022-05-31 PROCEDURE — 97162 PT EVAL MOD COMPLEX 30 MIN: CPT

## 2022-05-31 PROCEDURE — 85025 COMPLETE CBC W/AUTO DIFF WBC: CPT

## 2022-05-31 PROCEDURE — 97530 THERAPEUTIC ACTIVITIES: CPT

## 2022-05-31 PROCEDURE — 2580000003 HC RX 258: Performed by: INTERNAL MEDICINE

## 2022-05-31 PROCEDURE — 2700000000 HC OXYGEN THERAPY PER DAY

## 2022-05-31 PROCEDURE — 80048 BASIC METABOLIC PNL TOTAL CA: CPT

## 2022-05-31 PROCEDURE — 97166 OT EVAL MOD COMPLEX 45 MIN: CPT

## 2022-05-31 RX ORDER — BISACODYL 10 MG
10 SUPPOSITORY, RECTAL RECTAL DAILY PRN
Status: DISCONTINUED | OUTPATIENT
Start: 2022-05-31 | End: 2022-05-31 | Stop reason: HOSPADM

## 2022-05-31 RX ADMIN — INSULIN LISPRO 1 UNITS: 100 INJECTION, SOLUTION INTRAVENOUS; SUBCUTANEOUS at 08:49

## 2022-05-31 RX ADMIN — POLYETHYLENE GLYCOL 3350 17 G: 17 POWDER, FOR SOLUTION ORAL at 10:52

## 2022-05-31 RX ADMIN — ESCITALOPRAM OXALATE 20 MG: 10 TABLET ORAL at 10:45

## 2022-05-31 RX ADMIN — QUETIAPINE FUMARATE 25 MG: 25 TABLET ORAL at 12:33

## 2022-05-31 RX ADMIN — SODIUM CHLORIDE, PRESERVATIVE FREE 10 ML: 5 INJECTION INTRAVENOUS at 10:49

## 2022-05-31 RX ADMIN — IPRATROPIUM BROMIDE AND ALBUTEROL SULFATE 1 AMPULE: .5; 3 SOLUTION RESPIRATORY (INHALATION) at 09:06

## 2022-05-31 RX ADMIN — LEVOTHYROXINE SODIUM 150 MCG: 0.1 TABLET ORAL at 06:01

## 2022-05-31 RX ADMIN — BUPROPION HYDROCHLORIDE 75 MG: 75 TABLET, FILM COATED ORAL at 10:45

## 2022-05-31 RX ADMIN — FAMOTIDINE 20 MG: 20 TABLET, FILM COATED ORAL at 10:45

## 2022-05-31 RX ADMIN — APIXABAN 5 MG: 5 TABLET, FILM COATED ORAL at 10:45

## 2022-05-31 RX ADMIN — TRAMADOL HYDROCHLORIDE 50 MG: 50 TABLET, COATED ORAL at 12:32

## 2022-05-31 RX ADMIN — BISACODYL 10 MG: 10 SUPPOSITORY RECTAL at 08:43

## 2022-05-31 RX ADMIN — LACOSAMIDE 100 MG: 100 TABLET, FILM COATED ORAL at 10:45

## 2022-05-31 RX ADMIN — VALPROIC ACID 400 MG: 250 SOLUTION ORAL at 10:47

## 2022-05-31 RX ADMIN — INSULIN LISPRO 1 UNITS: 100 INJECTION, SOLUTION INTRAVENOUS; SUBCUTANEOUS at 12:38

## 2022-05-31 RX ADMIN — LISINOPRIL 5 MG: 10 TABLET ORAL at 10:45

## 2022-05-31 ASSESSMENT — PAIN DESCRIPTION - DESCRIPTORS: DESCRIPTORS: ACHING

## 2022-05-31 ASSESSMENT — PAIN SCALES - GENERAL
PAINLEVEL_OUTOF10: 0
PAINLEVEL_OUTOF10: 0
PAINLEVEL_OUTOF10: 6

## 2022-05-31 ASSESSMENT — PAIN - FUNCTIONAL ASSESSMENT: PAIN_FUNCTIONAL_ASSESSMENT: PREVENTS OR INTERFERES SOME ACTIVE ACTIVITIES AND ADLS

## 2022-05-31 NOTE — PLAN OF CARE
Problem: Discharge Planning  Goal: Discharge to home or other facility with appropriate resources  Outcome: Progressing     Problem: Pain  Goal: Verbalizes/displays adequate comfort level or baseline comfort level  Outcome: Progressing  Flowsheets  Taken 5/31/2022 0000  Verbalizes/displays adequate comfort level or baseline comfort level: Encourage patient to monitor pain and request assistance  Taken 5/30/2022 2000  Verbalizes/displays adequate comfort level or baseline comfort level: Encourage patient to monitor pain and request assistance     Problem: Skin/Tissue Integrity  Goal: Absence of new skin breakdown  Description: 1. Monitor for areas of redness and/or skin breakdown  2. Assess vascular access sites hourly  3. Every 4-6 hours minimum:  Change oxygen saturation probe site  4. Every 4-6 hours:  If on nasal continuous positive airway pressure, respiratory therapy assess nares and determine need for appliance change or resting period.   Outcome: Progressing     Problem: Safety - Adult  Goal: Free from fall injury  Outcome: Progressing  Flowsheets (Taken 5/31/2022 0054)  Free From Fall Injury: Instruct family/caregiver on patient safety     Problem: Nutrition Deficit:  Goal: Optimize nutritional status  Outcome: Progressing

## 2022-05-31 NOTE — PROGRESS NOTES
[facility] [address] Department   Phone: [phone]    Physical Therapy    [x] Initial Evaluation            [] Daily Treatment Note         [] Discharge Summary      Patient: Fritz Kc   : 1950   MRN: [id number]   Date of Service:  2022  Admitting Diagnosis: Acute hypoxemic respiratory failure [facility])  Current Admission Summary: Fritz Kc is a 71 y.o. male that presents to the ER for evaluation of increasing respiratory distress hypoxia is a history of recurrent aspiration pneumonia, status post recent hospitalization 1 month prior to [facility] where he had multiple bronchoscopies due to obstruction of his left upper and left lower lobe. Positive hypoxia on arrival history of tracheostomy due to tongue cancer history of lung cancer,  Past Medical History:  has a past medical history of Anemia, Anxiety, Aspiration pneumonia ([address] Atrial fibrillation ([address] CAD (coronary artery disease), Cancer ([address] Chronic kidney disease, Depression, Diabetes mellitus ([address] GERD (gastroesophageal reflux disease), History of malignant neoplasm of tongue, Hyperlipidemia, Hypertension, IBS (irritable bowel syndrome), Pneumonia, Schizoaffective disorder ([address] Seizures ([address] and Thyroid disease. Past Surgical History:  has a past surgical history that includes tracheostomy and Gastrostomy tube placement. Discharge Recommendations: Fritz Kc scored a 16/24 on the AM-PAC short mobility form. Current research shows that an AM-PAC score of 17 or less is typically not associated with a discharge to the patient's home setting. Based on the patient's AM-PAC score and their current functional mobility deficits, it is recommended that the patient have 3-5 sessions per week of Physical Therapy at d/c to increase the patient's independence. Please see assessment section for further patient specific details.     If patient discharges prior to next session this note will serve as a discharge summary. Please see below for the latest assessment towards goals. DME Required For Discharge: DME to be determined at next level of care  Precautions/Restrictions: high fall risk, modified diet, . Comment: PEG tube  Weight Bearing Restrictions: no restrictions  [] Right Upper Extremity  [] Left Upper Extremity [] Right Lower Extremity  [] Left Lower Extremity     Required Braces/Orthotics: no braces required   [] Right  [] Left  Positional Restrictions:no positional restrictions    Pre-Admission Information   Lives With: alone    Type of Home: long term care facility, . Comment: [facility]  Home Layout: one level  Home Access: level entry  Bathroom Layout: walker accessible, walk in shower  Toilet Height: standard height  Bathroom Equipment: grab bars in shower, grab bars around toilet, shower chair  Home Equipment: rolling walker  Transfer Assistance: modified independent with use of RW  Ambulation Assistance:modified independent with use of RW, . Comment: usually followed by [facility]  ADL Assistance: requires assistance with bathing, requires assistance with toileting, . Comment: has feeding tube. Pt says they are able to brush teeth  IADL Assistance: Pt has help with at nursing home  Active :        [] Yes  [x] No  Hand Dominance: [] Left  [x] Right  Current Employment: unemployed  Hobbies:   Recent Falls: Pt reports falling at random when moving. Pt has had about 5 falls recently.     Examination   Vision:   Vision Corrective Device: wears glasses for reading  Hearing:   Slightly diminished  Observation:   General Observation:  PEG tube and trach tube in place  Posture:    Fair  Sensation:   WFL  Proprioception:    WFL  Tone:   Normotonic  Coordination Testing:   WFL    ROM:   (B) LE AROM WFL  Strength:   (B) LE strength grossly WFL  Decision Making: medium complexity  Clinical Presentation: evolving      Subjective  General: Pt presents as seated on BSC after receiving suppository. Pt agreeable to PT/OT eval.  Pain: 6/10. Location: chest and back  Pain Interventions: RN notified of patient request for pain medication       Functional Mobility  Bed Mobility  Bed mobility not completed on this date. Comments:  Transfers  Sit to stand transfer: minimal assistance  Stand to sit transfer: contact guard assistance  Stand pivot transfer: moderate assistance  Comments:  Ambulation  Ambulation not tested on this date. Distance: N/A  Gait Mechanics: N/A  Comments:    Stair Mobility  Stair mobility not completed on this date. Comments:  Wheelchair Mobility:  No w/c mobility completed on this date. Comments:  Balance  Static Sitting Balance: fair (+): maintains balance at SBA/supervision without use of UE support  Dynamic Sitting Balance: fair (+): maintains balance at SBA/supervision without use of UE support  Static Standing Balance: poor (+): requires min (A) to maintain balance  Dynamic Standing Balance: poor: requires mod (A) to maintain balance  Comments:    Other Therapeutic Interventions    Functional Outcomes  AM-PAC Inpatient Mobility Raw Score : 16              Cognition  WFL  Orientation:    alert and oriented x 4  Command Following:   [facility]    Education  Barriers To Learning: language and hearing (nonverbal from trach)  Patient Education: patient educated on goals, PT role and benefits, plan of care, general safety, discharge recommendations  Learning Assessment:  patient verbalizes understanding, would benefit from continued reinforcement    Assessment  Activity Tolerance: Fair  Impairments Requiring Therapeutic Intervention: decreased functional mobility, decreased ADL status, decreased ROM, decreased strength, decreased endurance, decreased balance  Prognosis: good  Clinical Assessment: Pt presents as below his baseline function and would benefit from skilled PT services to promote safe return to OF.    Safety Interventions: patient left in chair, chair alarm in place, call light within reach, gait belt, patient at risk for falls and nurse notified    Plan  Frequency: 3-5 x/per week  Current Treatment Recommendations: strengthening, ROM, balance training, functional mobility training, transfer training, gait training, endurance training, safety education and equipment evaluation/education    Goals  Patient Goals:  To go somewhere for therapy   Short Term Goals:  Time Frame: Prior to D/C  Patient will complete bed mobility at contact guard assistance   Patient will complete transfers at contact guard assistance   Patient will ambulate 25 ft with use of rolling walker at minimal assistance    Therapy Session Time      Individual Group Co-treatment   Time In     1003   Time Out      1033   Minutes      30     Timed Code Treatment Minutes:  15 Minutes  Total Treatment Minutes:  30       Electronically Signed By: Bertis Harada, PT Bertis Harada, PT, DPT, [id number]

## 2022-05-31 NOTE — PROGRESS NOTES
Physician Progress Note      Gaye Fuentes  [facility] #:                  [id number]  :                       1950  ADMIT DATE:       2022 6:52 AM  DISCH DATE:  RESPONDING  PROVIDER #:        Bob Lee MD          QUERY TEXT:    Pt admitted with Acute hypoxic and hypercapnic respiratory failure. Noted   documentation of Pneumonia on  ED note by ordered Emergency Department   physician. If possible, please document in progress notes and discharge   summary:    The medical record reflects the following:  Risk Factors: Acute hypoxic and hypercapnic respiratory failure likely 2/2   mucous plugging, recent PNA diagnosis  Clinical Indicators:  ED note documented    Pneumonia of left lower lobe   due to infectious organism.  H&P note documented patient was recently   admitted at [facility] (22 -22) for DHIRAJ PNA; treated Vanco and   cefepime for MRSA and Pseudomonas pneumonia. Underwent bronchoscopy on   22 which showed severe mucous plugging. BAL grew antonio which was   thought to be contamination per infectious disease recs.  Pulmonology   note documented patient has history of recurrent bouts of pneumonia. Chest   xray found Complete opacification of the left hemithorax  Treatment: Pulmonology consult, Bronchoscopy, Chest xray, chest CT, IV   Maxipime, IV Vancomycin  Options provided:  -- Pneumonia confirmed present on admission  -- Pneumonia ruled out  -- Other - I will add my own diagnosis  -- Disagree - Not applicable / Not valid  -- Disagree - Clinically unable to determine / Unknown  -- Refer to Clinical Documentation Reviewer    PROVIDER RESPONSE TEXT:    The diagnosis of Pneumonia was ruled out.     Query created by: Matti Tran on 2022 3:24 PM      Electronically signed by:  Bob Lee MD 2022 3:36 PM

## 2022-05-31 NOTE — DISCHARGE INSTR - COC
Continuity of Care Form    Patient Name: Patricia Seay   :  1950  MRN:  [id number]    Admit date:  2022  Discharge date:  22    Code Status Order: Full Code   Advance Directives:      Admitting Physician:  Dayron Chance MD  PCP: No primary care provider on file.     Discharging Nurse: mE CONROY [facility] Unit/Room#: X5C-1500/3677-02  Discharging Unit Phone Number: [phone]    Emergency Contact:   Extended Emergency Contact Information  Primary Emergency Contact: Tim Phone: [phone]  Relation: Spouse  Secondary Emergency Contact: garrett nj  Home Phone: [phone]  Relation: Child    Past Surgical History:  Past Surgical History:   Procedure Laterality Date    GASTROSTOMY TUBE PLACEMENT      TRACHEOSTOMY         Immunization History:   Immunization History   Administered Date(s) Administered    COVID-19, Sowmya Gann, Primary or Immunocompromised, PF, 100mcg/0.5mL 2021    COVID-19, Avaak top, DO NOT Dilute, John-Sucrose, 12+ yrs, PF, 30 mcg/0.3 mL dose 2022       Active Problems:  Patient Active Problem List   Diagnosis Code    Acute hypoxemic respiratory failure (HCC) J96.01    Bronchiectasis with acute exacerbation ([facility] J47.1    History of lung cancer Z85.118    Tracheostomy dependent ([facility] 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       Isolation/Infection:   Isolation            No Isolation          Patient Infection Status       None to display            Nurse Assessment:  Last Vital Signs: /76   Pulse (!) 106   Temp 97.6 °F (36.4 °C) (Temporal)   Resp 23   Ht 5' 6\" (1.676 m)   Wt 143 lb 8.3 oz (65.1 kg)   SpO2 100%   BMI 23.16 kg/m²     Last documented pain score (0-10 scale): Pain Level: 6  Last Weight:   Wt Readings from Last 1 Encounters:   22 143 lb 8.3 oz (65.1 kg)     Mental Status:  oriented and alert    IV Access:  - None    Nursing Mobility/ADLs:  Walking   Assisted  Transfer  Assisted  Bathing  Assisted  Dressing  Assisted  Toileting Assisted  Feeding  Dependent/NPO  Med Admin  Dependent/NPO  Med Delivery    Peg tube    Wound Care Documentation and Therapy:        Elimination:  Continence: Bowel: Yes  Bladder: Yes  Urinary Catheter: None   Colostomy/Ileostomy/Ileal Conduit: No       Date of Last BM: 5/31/2022/SMALL    Intake/Output Summary (Last 24 hours) at 5/31/2022 1314  Last data filed at 5/31/2022 0600  Gross per 24 hour   Intake 1168 ml   Output 850 ml   Net 318 ml     I/O last 3 completed shifts: In: 5872 [NG/GT:1168]  Out: 1400 [Urine:1400]    Safety Concerns:      At Risk for Falls    Impairments/Disabilities:      Speech    Nutrition Therapy:  Current Nutrition Therapy:   - Tube Feedings:  40 ml/hr over 24 hrs per day/160cc water flush every Q 3 hours    Routes of Feeding: Gastrostomy Tube  Liquids: No Liquids  Daily Fluid Restriction: no  Last Modified Barium Swallow with Video (Video Swallowing Test): not done    Treatments at the Time of Hospital Discharge:   Respiratory Treatments: ***  Oxygen Therapy:  trach T-piece 6L  Ventilator:    - No ventilator support    Rehab Therapies: Physical Therapy  Weight Bearing Status/Restrictions: No weight bearing restrictions  Other Medical Equipment (for information only, NOT a DME order):  {EQUIPMENT:952127424}  Other Treatments: ***    Patient's personal belongings (please select all that are sent with patient):  {[facility] DME Belongings:674358727}    RN SIGNATURE:  Electronically signed by Heidy Barrientos RN on 5/31/22 at 3:30 PM EDT    CASE MANAGEMENT/SOCIAL WORK SECTION    Inpatient Status Date: ***    Readmission Risk Assessment Score:  Readmission Risk              Risk of Unplanned Readmission:  16           Discharging to Facility/ [facility]    / signature: {Esignature:036970471}    PHYSICIAN SECTION    Prognosis: {Prognosis:8521672600}    Condition at Discharge: [facility] Patient Condition:412670664}    Rehab Potential (if transferring to Rehab): {Prognosis:8966003707}    Recommended Labs or Other Treatments After Discharge: ***    Physician Certification: I certify the above information and transfer of Ada Avila  is necessary for the continuing treatment of the diagnosis listed and that he requires {Admit to Appropriate Level of Care:09063} for {GREATER/LESS:649776680} 30 days.      Update Admission H&P: {CHP DME Changes in EGMVW:340784995}    PHYSICIAN SIGNATURE:  {Esignature:317098179}

## 2022-05-31 NOTE — PROGRESS NOTES
This RN spoke to patient's daughter, Shira Velasquez in regards to patient status and plan of care. Patient's daughter voiced wanting to speak to SW in regards to SNF options. See new orders for SW consult. No further questions at this time.

## 2022-05-31 NOTE — PROGRESS NOTES
IV's removed. All belongings sent with wife. Report called to Clint Reasons at [facility]. Patient discharged.

## 2022-05-31 NOTE — PROGRESS NOTES
[facility] [address] Department   Phone: [phone]    Occupational Therapy    [x] Initial Evaluation            [] Daily Treatment Note         [] Discharge Summary      Patient: Yana Armijo   : 1950   MRN: [id number]   Date of Service:  2022    Admitting Diagnosis:  Acute hypoxemic respiratory failure [facility])  Current Admission Summary: Alan Louis a 71 y. o. male that presents to the ER for evaluation of increasing respiratory distress hypoxia is a history of recurrent aspiration pneumonia, status post recent hospitalization 1 month prior to [facility] where he had multiple bronchoscopies due to obstruction of his left upper and left lower lobe.  Positive hypoxia on arrival history of tracheostomy due to tongue cancer history of lung cancer,  Past Medical History:  has a past medical history of Anemia, Anxiety, Aspiration pneumonia ([address] Atrial fibrillation ([address] CAD (coronary artery disease), Cancer ([address] Chronic kidney disease, Depression, Diabetes mellitus ([address] GERD (gastroesophageal reflux disease), History of malignant neoplasm of tongue, Hyperlipidemia, Hypertension, IBS (irritable bowel syndrome), Pneumonia, Schizoaffective disorder ([address] Seizures ([address] and Thyroid disease. Past Surgical History:  has a past surgical history that includes tracheostomy and Gastrostomy tube placement. Discharge Recommendations: Yana Armijo scored a 10/24 on the AM-PAC ADL Inpatient form. Current research shows that an AM-PAC score of 17 or less is typically not associated with a discharge to the patient's home setting. Based on the patient's AM-PAC score and their current ADL deficits, it is recommended that the patient have 3-5 sessions per week of Occupational Therapy at d/c to increase the patient's independence. Please see assessment section for further patient specific details.     If patient discharges prior to next session this note will serve as a discharge summary. Please see below for the latest assessment towards goals. DME Required For Discharge: DME to be determined at next level of care    Precautions/Restrictions: high fall risk, . Comment: trach, NPO (PEG tube)  Weight Bearing Restrictions: no restrictions  [] Right Upper Extremity  [] Left Upper Extremity [] Right Lower Extremity  [] Left Lower Extremity     Required Braces/Orthotics: no braces required   [] Right  [] Left  Positional Restrictions:no positional restrictions    Pre-Admission Information   Lives With: alone                 Type of Home: long term care facility, . Comment: [facility]  Home Layout: one level  Home Access: level entry  Bathroom Layout: walker accessible, walk in shower  Toilet Height: standard height  Bathroom Equipment: grab bars in shower, grab bars around toilet, shower chair  Home Equipment: rolling walker  Transfer Assistance: modified independent with use of RW  Ambulation Assistance:modified independent with use of RW, . Comment: usually followed by [facility]  ADL Assistance: requires assistance with bathing, requires assistance with toileting, . Comment: has feeding tube. Pt says they are able to brush teeth  IADL Assistance: Pt has help with at nursing home  Active :        []? Yes            [x]? No  Hand Dominance: []? Left            [x]? Right  Current Employment: unemployed  Hobbies:   Recent Falls: Pt reports falling at random when moving.  Pt has had about 5 falls recently.     Examination   Vision:   Vision Corrective Device: wears glasses for reading  Hearing:   Slightly diminished  Observation:   General Observation:  PEG tube and trach tube in place    Sensation:   WFL  Proprioception:    WFL  Tone:   Normotonic  Coordination Testing:   WFL    ROM:   (B) UE AROM WFL  Strength:   (B) UE strength grossly WFL    Decision Making: medium complexity  Clinical Presentation: stable      Subjective  General: Patient sitting on bsc, agreeable to evaluation  Pain: 6/10. Location: Chest and back  Pain Interventions: RN notified, RN notified of patient request for pain medication and repositioned         Activities of Daily Living  Basic Activities of Daily Living  Lower Extremity Bathing: maximum assistance. Equipment: none  Lower Extremity Dressing: dependent. Equipment: none  Toileting: maximum assistance. Equipment: none  Instrumental Activities of Daily Living  No IADL completed on this date. Functional Mobility  Bed Mobility  Bed mobility not completed on this date. (patient seated on bsc, tx to recliner)  Comments:  Transfers  Sit to stand transfer:minimal assistance  Stand to sit transfer: contact guard assistance  Toilet transfer: moderate assistance. Mobility technique: stand pivot transfer. Equipment utilized: standard bedside commode  Comments:  Functional Mobility:  No functional mobility completed on this date.     Other Therapeutic Interventions    Functional Outcomes  AM-PAC Inpatient Daily Activity Raw Score: 10    Cognition  WFL  Orientation:    alert and oriented x 4  Command Following:   [facility]     Education  Barriers To Learning: language (trach)  Patient Education: patient educated on goals, OT role and benefits, plan of care, discharge recommendations  Learning Assessment:  patient verbalizes and demonstrates understanding    Assessment  Activity Tolerance: Patient tolerated fairly well, frustrated with inability to have BM  Impairments Requiring Therapeutic Intervention: decreased functional mobility, decreased ADL status, decreased endurance, decreased balance  Prognosis: good  Clinical Assessment: Patient presents with the above deficits, which are below baseline, and would benefit from continued skilled OT to address  Safety Interventions: patient left in chair, chair alarm in place, call light within reach, patient at risk for falls and nurse notified    Plan  Frequency: 3-5 x/per week  Current Treatment Recommendations: balance training, functional mobility training, transfer training, endurance training, patient/caregiver education and ADL/self-care training    Goals  Patient Goals:  To return home   Short Term Goals:  Time Frame: Upon discharge  Patient will complete upper body ADL at stand by assistance   Patient will complete toileting at minimal assistance   Patient will complete grooming at supervision   Patient will complete functional transfers at minimal assistance   Patient will increase functional standing balance to 3-5 minutes, Nikki for improved ADL completion    Therapy Session Time     Individual Group Co-treatment   Time In    1003   Time Out    1033   Minutes    30        Timed Code Treatment Minutes:   15  Total Treatment Minutes:  30       Electronically Signed By: Sara Garza OTR/L [id number]

## 2022-05-31 NOTE — PROGRESS NOTES
[facility] Pulmonary/CCM Progress note      Admit Date: 5/29/2022    Chief Complaint: Shortness of breath    Subjective: Interval History: Appears comfortable on T-piece, no significant mucous plugging noted with suction. Hemodynamically stable.     Scheduled Meds:   sodium chloride flush  5-40 mL IntraVENous 2 times per day    insulin lispro  0-6 Units SubCUTAneous TID WC    insulin lispro  0-3 Units SubCUTAneous Nightly    apixaban  5 mg Oral BID    escitalopram  20 mg PEG Tube Daily    famotidine  20 mg PEG Tube BID    levothyroxine  150 mcg PEG Tube Daily    lisinopril  5 mg PEG Tube Daily    QUEtiapine  25 mg PEG Tube BID    ipratropium-albuterol  1 ampule Inhalation BID    buPROPion  75 mg PEG Tube BID    valproic acid  400 mg Per G Tube BID    lacosamide  100 mg PEG Tube BID    traZODone  50 mg PEG Tube Nightly     Continuous Infusions:   sodium chloride      dextrose       PRN Meds:bisacodyl, sodium chloride flush, sodium chloride, ondansetron **OR** ondansetron, polyethylene glycol, acetaminophen **OR** acetaminophen, albuterol, ipratropium-albuterol, dextrose bolus **OR** dextrose bolus, glucagon (rDNA), dextrose, traMADol    Review of Systems  Constitutional: negative for fatigue, fevers, malaise and weight loss  Ears, nose, mouth, throat: negative for ear drainage, epistaxis, hoarseness, nasal congestion, sore throat and voice change  Respiratory: negative except for shortness of breath  Cardiovascular: negative for chest pain, chest pressure/discomfort, irregular heart beat, lower extremity edema and palpitations  Gastrointestinal: negative for abdominal pain, constipation, diarrhea, jaundice, melena, odynophagia, reflux symptoms and vomiting  Hematologic/lymphatic: negative for bleeding, easy bruising, lymphadenopathy and petechiae  Musculoskeletal:negative for arthralgias, bone pain, muscle weakness, neck pain and stiff joints  Neurological: negative for dizziness, gait problems, headaches, seizures, speech problems, tremors and weakness  Behavioral/Psych: negative for anxiety, behavior problems, depression, fatigue and sleep disturbance  Endocrine: negative for diabetic symptoms including none, neuropathy, polyphagia, polyuria, polydipsia, vomiting and diarrhea and temperature intolerance  Allergic/Immunologic: negative for anaphylaxis, angioedema, hay fever and urticaria    Objective:     Patient Vitals for the past 8 hrs:   BP Temp Temp src Pulse Resp SpO2   05/31/22 1241 128/76 97.6 °F (36.4 °C) Temporal (!) 106 23 100 %   05/31/22 1137 -- -- -- -- 22 --   05/31/22 0909 -- -- -- -- 18 98 %   05/31/22 0700 (!) 153/86 97.7 °F (36.5 °C) Temporal 93 20 97 %   05/31/22 0630 -- -- -- 92 21 97 %   05/31/22 0615 -- -- -- 94 23 97 %   05/31/22 0600 (!) 180/109 -- -- (!) 104 27 97 %   05/31/22 0545 -- -- -- 99 20 98 %     I/O last 3 completed shifts: In: 1749 [NG/GT:1168]  Out: 1400 [Urine:1400]  No intake/output data recorded.     General Appearance: alert and oriented to person, place and time, well developed and well- nourished, in no acute distress  Skin: warm and dry, no rash or erythema  Head: normocephalic and atraumatic  Eyes: pupils equal, round, and reactive to light, extraocular eye movements intact, conjunctivae normal  ENT: external ear and ear canal normal bilaterally, nose without deformity, nasal mucosa and turbinates normal  Neck: supple and non-tender without mass, no cervical lymphadenopathy  Pulmonary/Chest: rales present-scattered, bilateral  Cardiovascular: normal rate, regular rhythm,  no murmurs, rubs, distal pulses intact, no carotid bruits  Abdomen: soft, non-tender, non-distended, normal bowel sounds, no masses or organomegaly  Lymph Nodes: Cervical, supraclavicular normal  Extremities: no cyanosis, clubbing or edema  Musculoskeletal: normal range of motion, no joint swelling, deformity or tenderness  Neurologic: alert, no focal neurologic deficits    Data Review:  CBC: Lab Results   Component Value Date    WBC 7.7 05/31/2022    RBC 3.18 05/31/2022     BMP:   Lab Results   Component Value Date    GLUCOSE 137 05/31/2022    CO2 30 05/31/2022    BUN 23 05/31/2022    CREATININE 0.7 05/31/2022    CALCIUM 9.4 05/31/2022     ABG:   Lab Results   Component Value Date    KHW5RSE 37.1 05/29/2022    BEART 10.7 05/29/2022    U0VDQAWS 100.0 05/29/2022    PHART 7.406 05/29/2022    OZT8QOK 59.1 05/29/2022    PO2ART 206.0 05/29/2022    QCE8YXR 87.3 05/29/2022       Radiology: All pertinent images / reports were reviewed as a part of this visit. Narrative   EXAMINATION:   CT OF THE CHEST WITH CONTRAST 5/29/2022 8:02 am       TECHNIQUE:   CT of the chest was performed with the administration of intravenous   contrast. Multiplanar reformatted images are provided for review. Automated   exposure control, iterative reconstruction, and/or weight based adjustment of   the mA/kV was utilized to reduce the radiation dose to as low as reasonably   achievable.       COMPARISON:   None.       HISTORY:   ORDERING SYSTEM PROVIDED HISTORY: lung opacification, ro infarct, pna,   hemorhohga, mass   TECHNOLOGIST PROVIDED HISTORY:   Reason for exam:->lung opacification, ro infarct, pna, hemorhohga, mass   Decision Support Exception - unselect if not a suspected or confirmed   emergency medical condition->Emergency Medical Condition (MA)   Reason for Exam: lung opacification, ro infarct, pna, hemorhohga, mass       FINDINGS:   Mediastinum: Heart size normal.  Normal appearance of the aorta.  Pulmonary   arteries appear normal in caliber with no central filling defects.  The   thyroid gland is normal.  Normal esophagus.  Mediastinal lymph node   enlargement is mild.  An index prevascular node measures 11 mm.       Lungs/pleura: A tracheostomy tube is present.  Mild secretions within the   tube.  The distal trachea is patent.  Bronchioles are patent bilaterally.    There is near complete opacification of the left lung.  Multiple air   bronchograms extend throughout the lung.  Etiology is not clearly identified. Mild ground-glass opacities are scattered diffusely in the right lung.  There   is no evidence of pneumothorax.  Mild pleural thickening and trace pleural   fluid in the left chest.       Upper Abdomen: Adrenal glands are normal.  No significant findings in the   visualized upper abdomen.       Soft Tissues/Bones: Heterogeneous sclerotic change of C7 through T2 as well   as T5.  No other significant skeletal findings.           Impression   1. Near complete opacification of the left hemithorax.  Etiology is not   determined.  Aspiration may be considered, although no obvious airway   obstruction is observed on the current exam.  Unknown history of prior cancer   in the provided clinical record.  Correlate with any potential oncologic or   treatment history. 2. Scattered ground-glass opacities throughout the right lung.  A developing   pattern of pneumonitis may be considered clinically as well. 3. Mildly prominent mediastinal lymph nodes. 4. There are sclerotic changes within several cervical and thoracic vertebral   bodies.  These also could be due to prior treatment.  Metastatic disease can   not be excluded with a known history of malignancy.  Underlying degenerative   changes considered less likely. Problem List:     Acute hypoxic respiratory failure  Left lung atelectasis  History of recurrent pneumonia status post tracheostomy October 2021  History of head and neck and lung cancer  Assessment/Plan:     Currently on T-piece-receiving 6 L O2, appears comfortable and O2 sats are close to 100%. Bronchoscopy performed on 5/29 revealed no evidence of lung mass or mucous plugging of airway. CT confirms chronic atelectatic/left lung fibrosis which is diffuse and similar in appearance based on prior imaging reports. Respiratory cultures are currently awaited.   No acute pulmonary cause for patient's respiratory symptoms. Troponin negative, EKG upon admission revealed normal sinus rhythm.     Okay for transfer to nursing facility-patient is currently a resident of [facility], exploring other options if possible based on patient's request.    Doretha Chau MD

## 2022-05-31 NOTE — CARE COORDINATION
Discharge Plan:     Patient discharged to:  [facility] at [facility] [employer] faxed, [address] and SUPRIYA to: [phone]  Narcotic Prescriptions faxed were:n/a  RN: (nurse's name) will call report to:    [address] with: HOSP Ahmeek NEREIDAAddison  ([phone])   time: 3:45pm  Family advised of discharge?:yes  HENS Submitted?:  n/a  All discharge needs met per case management.     Electronically signed by PIERRE Horner on 5/31/2022 at 3:18 PM

## 2022-05-31 NOTE — PROGRESS NOTES
[address]  Patient has size 8 Shiley, Portex, XLT or other. Trach Kit of same size on standby  Yes, Obturator at head of the bed  Yes. Inner cannula cleaned/replaced Yes, drain sponge changed  Yes, Trach tie replaced is soiled No, Flange cleaned  Yes. [address] performed without complications. Comment patient tolerated well.

## 2022-05-31 NOTE — DISCHARGE INSTR - DIET

## 2022-05-31 NOTE — PROGRESS NOTES
This RN attempted to get into contact with patient SNF at this time. While on phone with facility the call was disconnected. Will relay to the dayshift nurse to reattempt to give SNF an update.

## 2022-06-01 LAB — MISCELLANEOUS LAB TEST ORDER: NORMAL

## 2022-06-02 LAB
BLOOD CULTURE, ROUTINE: NORMAL
CULTURE, BLOOD 2: NORMAL
MISCELLANEOUS LAB TEST ORDER: NORMAL

## 2022-06-03 LAB
CULTURE, RESPIRATORY: ABNORMAL
CULTURE, RESPIRATORY: ABNORMAL
GRAM STAIN RESULT: ABNORMAL
ORGANISM: ABNORMAL

## 2022-06-07 NOTE — DISCHARGE SUMMARY
Hospital Medicine Discharge Summary    Patient ID: Diaz Quinonez      Patient's PCP: No primary care provider on file. Admit Date: 5/29/2022     Discharge Date: 5/31/2022     Admitting Physician: Alma Delia Tran MD     Discharge Physician: Tracy Hayes MD        Active Hospital Problems    Diagnosis     Acute hypoxemic respiratory failure [facility]) [J96.01]      Priority: Medium    Bronchiectasis with acute exacerbation ([facility] [address] [J47.1]      Priority: Medium    History of lung cancer [Z85.118]      Priority: Medium    Tracheostomy dependent ([facility] [address] [Z93.0]      Priority: Medium         Hospital Course: This 69 y. o. 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 When EMS arrived, patient was pale, diaphoretic and reported that his chest was hurting. Stat EKG obtained showed sinus tachycardia per EMS report. On admission, patient was hypoxic and hypercarbic. CXR showed complete opacification of left hemithorax. Of note, patient was recently admitted at [facility] (4/18/22 -5/2/22) for DHIRAJ PNA; treated Vanco and cefepime for MRSA and Pseudomonas pneumonia.  Underwent bronchoscopy on 4/29/22 which showed severe mucous plugging. BAL grew antonio which was thought to be contamination per infectious disease recs.      Acute hypoxic respiratory failure likely 2/2 mucous plugging;  resolved. CXR complete opacification of left hemithorax. CT chest showed near complete opacification left hemithorax without any obvious airway obstruction.  Severe bronchiectasis and fibrosis noted. Pulmonology was consulted and patient underwent bronc on 5/ 29/22 which showed scant secretion with widely patent airways.  BAL cx sent in.     Hx of A. fib: On Eliquis     Hypertension: Continue lisinopril.     Hx of diabetes mellitus; hemoglobin A1c 5.7 on 5/29/22. Continue current regimen     Hx of hypothyroidism: Continue Synthroid.      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 Eyes: Sclera clear. Pupils equal.  ENT: Moist oral mucosa. Trachea midline, no adenopathy. Trach in place  Cardiovascular: Regular rhythm, normal S1, S2. No murmur. Respiratory: Clear to auscultation bilaterally, no wheeze or crackles. GI: Abdomen soft, no tenderness, not distended, normal bowel sounds. PEG tube in place  Musculoskeletal: Normal ROM, No cyanosis in digits. Neurology: CN 2-12 grossly intact. No speech or motor deficits  Psych: Normal affect. Alert and oriented in time, place and person  Skin: Warm, dry, normal turgor    Consults:     IP CONSULT TO PHARMACY  IP CONSULT TO SOCIAL WORK    Disposition: SNF    Condition at Discharge: Stable     Discharge Instructions/Follow-up:   Follow up with your PCP within 7 days of discharge. Take all your medications as prescribed.     Discharge Medications:     Discharge Medication List as of 5/31/2022  3:37 PM           Details   acetaminophen (TYLENOL) 160 MG/5ML liquid 650 mg by Per G Tube route every 6 hours as needed for Fever Historical Med      acetylcysteine (MUCOMYST) 20 % nebulizer solution 4 mLs 2 times dailyHistorical Med      albuterol sulfate HFA (PROVENTIL HFA) 108 (90 Base) MCG/ACT inhaler Inhale 2 puffs into the lungs every 6 hours as needed for Wheezing or Shortness of BreathHistorical Med      apixaban (ELIQUIS) 5 MG TABS tablet 5 mg by Per G Tube route 2 times daily Historical Med      buPROPion (WELLBUTRIN) 75 MG tablet 75 mg by PEG Tube route 2 times daily Historical Med      chlorhexidine (PERIDEX) 0.12 % solution Take 15 mLs by mouth in the morning, at noon, and at bedtime Take 15 ml by mouth TID for URI. Historical Med      diclofenac sodium (VOLTAREN) 1 % GEL Apply 2 g topically 2 times daily Apply to lower back, Topical, 2 TIMES DAILY, Historical Med      doxycycline monohydrate (MONODOX) 100 MG capsule 100 mg by Per G Tube route daily Historical Med      escitalopram (LEXAPRO) 20 MG tablet 20 mg by PEG Tube route daily Historical Med      famotidine (PEPCID) 20 MG tablet 20 mg by PEG Tube route 2 times daily Historical Med      fluticasone (FLONASE) 50 MCG/ACT nasal spray 1 spray by Each Nostril route daily as needed for RhinitisHistorical Med      folic acid (FOLVITE) 1 MG tablet 1 mg by PEG Tube route daily Historical Med      glycopyrrolate (ROBINUL) 0.2 MG/ML injection Inject 0.2 mg into the skin every 8 hours as needed (neurologic disorders) Historical Med      insulin regular (HUMULIN R;NOVOLIN R) 100 UNIT/ML injection Inject into the skin 3 times daily (before meals) 0-150 = 0  151-200 = 2  201-250 = 4  251-300 = 6  301-350 = 8  351-600 = 12 recheck in 2 hoursHistorical Med      hydrOXYzine (ATARAX) 50 MG tablet 50 mg by PEG Tube route every 8 hours as needed for Itching or Anxiety Historical Med      ibuprofen (ADVIL;MOTRIN) 400 MG tablet 400 mg by PEG Tube route every 6 hours as needed for Pain Historical Med      ipratropium-albuterol (DUONEB) 0.5-2.5 (3) MG/3ML SOLN nebulizer solution Inhale 1 vial into the lungs every 8 hoursHistorical Med      levoFLOXacin (LEVAQUIN) 750 MG tablet 750 mg by PEG Tube route daily For 5 days, started on 5/27/22. Historical Med      levothyroxine (SYNTHROID) 150 MCG tablet 150 mcg by PEG Tube route Daily Historical Med      lidocaine 4 % external patch Place 1 patch onto the skin daily 12 hours on, 12 hours off., TransDERmal, DAILY, Historical Med      lisinopril (PRINIVIL;ZESTRIL) 5 MG tablet 5 mg by PEG Tube route daily Historical Med      Melatonin 10 MG TABS 10 mg by PEG Tube route at bedtime Historical Med      ondansetron (ZOFRAN) 8 MG tablet 8 mg by PEG Tube route every 6 hours as needed for Nausea or Vomiting Historical Med      QUEtiapine (SEROQUEL) 25 MG tablet 25 mg by PEG Tube route 2 times daily At noon and 1700 hrs. Historical Med      sodium chloride (SALINE MIST) 0.65 % nasal spray 1 spray by Nasal route in the morning and at bedtimeHistorical Med      senna (SENOKOT) 8.6 MG tablet 1 tablet by PEG Tube route 2 times daily as needed for Constipation Historical Med      sodium chloride, Inhalant, 3 % nebulizer solution Take 4 mLs by nebulization every 8 hours, Nebulization, EVERY 8 HOURS, Historical Med      terazosin (HYTRIN) 2 MG capsule 2 mg by PEG Tube route nightly Historical Med      traMADol (ULTRAM) 50 MG tablet 50 mg by PEG Tube route every 8 hours as needed for Pain. Historical Med      traZODone (DESYREL) 50 MG tablet 50 mg by PEG Tube route nightly Historical Med      valproic acid (DEPAKENE) 250 MG/5ML SOLN oral solution 400 mg by Per G Tube route 2 times daily Historical Med      lacosamide (VIMPAT) 100 MG TABS tablet 100 mg by PEG Tube route 2 times daily. Historical Med      albuterol (PROVENTIL) (2.5 MG/3ML) 0.083% nebulizer solution Take 2.5 mg by nebulization every 2 hours as needed for Shortness of BreathHistorical Med      Phenylephrine-DM-GG (ROBAFEN CF MULTI-SYMPTOM COLD) 5- MG/5ML LIQD 10 mLs by PEG Tube route every 4 hours as needed (Cough)Historical Med               The patient was seen and examined on day of discharge and this discharge summary is in conjunction with any daily progress note from day of discharge. Time Spent on discharge is 45 minutes  in the examination, evaluation, counseling and review of medications and discharge plan.       Note that greater  than 30 minutes was spent in preparing discharge papers, discussing discharge with patient, medication review, etc.     Signed:    Tracy Hayes MD   6/7/2022      Thank you No primary care provider on file. for the opportunity to be involved in this patient's care. If you have any questions or concerns please feel free to contact me at [phone].

## 2022-07-01 NOTE — PLAN OF CARE
Problem: Discharge Planning  Goal: Discharge to home or other facility with appropriate resources  5/30/2022 0951 by Carlin Espinoza RN  Outcome: Progressing  Discharge to home or other facility with appropriate resources: Identify barriers to discharge with patient and caregiver     Problem: Pain  Goal: Verbalizes/displays adequate comfort level or baseline comfort level  5/30/2022 0951 by Carlin Espinoza RN  Outcome: Progressing  Flowsheets  Taken 5/30/2022 0400  Verbalizes/displays adequate comfort level or baseline comfort level: Encourage patient to monitor pain and request assistance  Taken 5/30/2022 0000  Verbalizes/displays adequate comfort level or baseline comfort level: Encourage patient to monitor pain and request assistance  Verbalizes/displays adequate comfort level or baseline comfort level: Encourage patient to monitor pain and request assistance     Problem: Skin/Tissue Integrity  Goal: Absence of new skin breakdown  Description: 1. Monitor for areas of redness and/or skin breakdown  2. Assess vascular access sites hourly  3. Every 4-6 hours minimum:  Change oxygen saturation probe site  4. Every 4-6 hours:  If on nasal continuous positive airway pressure, respiratory therapy assess nares and determine need for appliance change or resting period.   5/30/2022 0951 by Carlin Espinoza RN  Outcome: Progressing  Problem: Safety - Adult  Goal: Free from fall injury  5/30/2022 0951 by Carlin Espinoza RN  Outcome: Progressing  Flowsheets (Taken 5/30/2022 0410)  Free From Fall Injury: Instruct family/caregiver on patient safety 69